# Patient Record
Sex: MALE | Race: WHITE | Employment: OTHER | URBAN - METROPOLITAN AREA
[De-identification: names, ages, dates, MRNs, and addresses within clinical notes are randomized per-mention and may not be internally consistent; named-entity substitution may affect disease eponyms.]

---

## 2022-07-24 ENCOUNTER — HOSPITAL ENCOUNTER (OUTPATIENT)
Age: 67
Setting detail: OBSERVATION
Discharge: LEFT BEFORE DISCHARGE | End: 2022-07-25
Attending: EMERGENCY MEDICINE | Admitting: INTERNAL MEDICINE
Payer: MEDICARE

## 2022-07-24 ENCOUNTER — APPOINTMENT (OUTPATIENT)
Dept: CT IMAGING | Age: 67
End: 2022-07-24
Payer: MEDICARE

## 2022-07-24 ENCOUNTER — APPOINTMENT (OUTPATIENT)
Dept: GENERAL RADIOLOGY | Age: 67
End: 2022-07-24
Payer: MEDICARE

## 2022-07-24 DIAGNOSIS — T50.901A ACCIDENTAL DRUG OVERDOSE, INITIAL ENCOUNTER: Primary | ICD-10-CM

## 2022-07-24 DIAGNOSIS — F12.20 DELTA-9-TETRAHYDROCANNABINOL (THC) DEPENDENCE (HCC): ICD-10-CM

## 2022-07-24 DIAGNOSIS — R42 DIZZINESS: ICD-10-CM

## 2022-07-24 LAB
ACETAMINOPHEN LEVEL: <5 MCG/ML (ref 10–30)
ALBUMIN SERPL-MCNC: 3.6 G/DL (ref 3.5–5.2)
ALP BLD-CCNC: 74 U/L (ref 40–129)
ALT SERPL-CCNC: 37 U/L (ref 0–40)
AMMONIA: 34.7 UMOL/L (ref 16–60)
AMPHETAMINE SCREEN, URINE: NOT DETECTED
ANION GAP SERPL CALCULATED.3IONS-SCNC: 9 MMOL/L (ref 7–16)
AST SERPL-CCNC: 34 U/L (ref 0–39)
BARBITURATE SCREEN URINE: NOT DETECTED
BASOPHILS ABSOLUTE: 0.03 E9/L (ref 0–0.2)
BASOPHILS RELATIVE PERCENT: 0.2 % (ref 0–2)
BENZODIAZEPINE SCREEN, URINE: NOT DETECTED
BILIRUB SERPL-MCNC: 0.4 MG/DL (ref 0–1.2)
BILIRUBIN URINE: NEGATIVE
BLOOD, URINE: NEGATIVE
BUN BLDV-MCNC: 16 MG/DL (ref 6–23)
CALCIUM SERPL-MCNC: 9.1 MG/DL (ref 8.6–10.2)
CANNABINOID SCREEN URINE: POSITIVE
CHLORIDE BLD-SCNC: 100 MMOL/L (ref 98–107)
CLARITY: CLEAR
CO2: 23 MMOL/L (ref 22–29)
COCAINE METABOLITE SCREEN URINE: NOT DETECTED
COLOR: YELLOW
CREAT SERPL-MCNC: 1.1 MG/DL (ref 0.7–1.2)
EOSINOPHILS ABSOLUTE: 0.01 E9/L (ref 0.05–0.5)
EOSINOPHILS RELATIVE PERCENT: 0.1 % (ref 0–6)
ETHANOL: <10 MG/DL (ref 0–0.08)
FENTANYL SCREEN, URINE: NOT DETECTED
GFR AFRICAN AMERICAN: >60
GFR NON-AFRICAN AMERICAN: >60 ML/MIN/1.73
GLUCOSE BLD-MCNC: 151 MG/DL (ref 74–99)
GLUCOSE URINE: NEGATIVE MG/DL
HCT VFR BLD CALC: 39.4 % (ref 37–54)
HEMOGLOBIN: 12.7 G/DL (ref 12.5–16.5)
IMMATURE GRANULOCYTES #: 0.04 E9/L
IMMATURE GRANULOCYTES %: 0.3 % (ref 0–5)
KETONES, URINE: NEGATIVE MG/DL
LACTIC ACID: 1.2 MMOL/L (ref 0.5–2.2)
LEUKOCYTE ESTERASE, URINE: NEGATIVE
LYMPHOCYTES ABSOLUTE: 1.01 E9/L (ref 1.5–4)
LYMPHOCYTES RELATIVE PERCENT: 8 % (ref 20–42)
Lab: ABNORMAL
MCH RBC QN AUTO: 29.1 PG (ref 26–35)
MCHC RBC AUTO-ENTMCNC: 32.2 % (ref 32–34.5)
MCV RBC AUTO: 90.2 FL (ref 80–99.9)
METER GLUCOSE: 111 MG/DL (ref 74–99)
METHADONE SCREEN, URINE: NOT DETECTED
MONOCYTES ABSOLUTE: 0.92 E9/L (ref 0.1–0.95)
MONOCYTES RELATIVE PERCENT: 7.3 % (ref 2–12)
NEUTROPHILS ABSOLUTE: 10.54 E9/L (ref 1.8–7.3)
NEUTROPHILS RELATIVE PERCENT: 84.1 % (ref 43–80)
NITRITE, URINE: NEGATIVE
OPIATE SCREEN URINE: NOT DETECTED
OXYCODONE URINE: NOT DETECTED
PDW BLD-RTO: 14.6 FL (ref 11.5–15)
PH UA: 6 (ref 5–9)
PHENCYCLIDINE SCREEN URINE: NOT DETECTED
PLATELET # BLD: 256 E9/L (ref 130–450)
PMV BLD AUTO: 9.8 FL (ref 7–12)
POTASSIUM REFLEX MAGNESIUM: 5.1 MMOL/L (ref 3.5–5)
PROTEIN UA: NEGATIVE MG/DL
RBC # BLD: 4.37 E12/L (ref 3.8–5.8)
SALICYLATE, SERUM: <0.3 MG/DL (ref 0–30)
SODIUM BLD-SCNC: 132 MMOL/L (ref 132–146)
SPECIFIC GRAVITY UA: 1.02 (ref 1–1.03)
TOTAL PROTEIN: 7.1 G/DL (ref 6.4–8.3)
TROPONIN, HIGH SENSITIVITY: 11 NG/L (ref 0–11)
TROPONIN, HIGH SENSITIVITY: 11 NG/L (ref 0–11)
UROBILINOGEN, URINE: 0.2 E.U./DL
WBC # BLD: 12.6 E9/L (ref 4.5–11.5)

## 2022-07-24 PROCEDURE — 99285 EMERGENCY DEPT VISIT HI MDM: CPT

## 2022-07-24 PROCEDURE — 85025 COMPLETE CBC W/AUTO DIFF WBC: CPT

## 2022-07-24 PROCEDURE — 81003 URINALYSIS AUTO W/O SCOPE: CPT

## 2022-07-24 PROCEDURE — 80053 COMPREHEN METABOLIC PANEL: CPT

## 2022-07-24 PROCEDURE — 80179 DRUG ASSAY SALICYLATE: CPT

## 2022-07-24 PROCEDURE — 96361 HYDRATE IV INFUSION ADD-ON: CPT

## 2022-07-24 PROCEDURE — 72040 X-RAY EXAM NECK SPINE 2-3 VW: CPT

## 2022-07-24 PROCEDURE — 70450 CT HEAD/BRAIN W/O DYE: CPT

## 2022-07-24 PROCEDURE — 36415 COLL VENOUS BLD VENIPUNCTURE: CPT

## 2022-07-24 PROCEDURE — 71045 X-RAY EXAM CHEST 1 VIEW: CPT

## 2022-07-24 PROCEDURE — 82962 GLUCOSE BLOOD TEST: CPT

## 2022-07-24 PROCEDURE — 80307 DRUG TEST PRSMV CHEM ANLYZR: CPT

## 2022-07-24 PROCEDURE — G0378 HOSPITAL OBSERVATION PER HR: HCPCS

## 2022-07-24 PROCEDURE — 93005 ELECTROCARDIOGRAM TRACING: CPT

## 2022-07-24 PROCEDURE — 83605 ASSAY OF LACTIC ACID: CPT

## 2022-07-24 PROCEDURE — 80143 DRUG ASSAY ACETAMINOPHEN: CPT

## 2022-07-24 PROCEDURE — 96360 HYDRATION IV INFUSION INIT: CPT

## 2022-07-24 PROCEDURE — 72100 X-RAY EXAM L-S SPINE 2/3 VWS: CPT

## 2022-07-24 PROCEDURE — 84484 ASSAY OF TROPONIN QUANT: CPT

## 2022-07-24 PROCEDURE — 82077 ASSAY SPEC XCP UR&BREATH IA: CPT

## 2022-07-24 PROCEDURE — 2580000003 HC RX 258

## 2022-07-24 PROCEDURE — 82140 ASSAY OF AMMONIA: CPT

## 2022-07-24 RX ORDER — 0.9 % SODIUM CHLORIDE 0.9 %
1000 INTRAVENOUS SOLUTION INTRAVENOUS ONCE
Status: COMPLETED | OUTPATIENT
Start: 2022-07-24 | End: 2022-07-24

## 2022-07-24 RX ADMIN — SODIUM CHLORIDE 1000 ML: 9 INJECTION, SOLUTION INTRAVENOUS at 17:27

## 2022-07-24 RX ADMIN — SODIUM CHLORIDE 1000 ML: 9 INJECTION, SOLUTION INTRAVENOUS at 16:05

## 2022-07-24 ASSESSMENT — PAIN SCALES - GENERAL: PAINLEVEL_OUTOF10: 9

## 2022-07-24 ASSESSMENT — ENCOUNTER SYMPTOMS
APNEA: 0
EYE DISCHARGE: 0
CHEST TIGHTNESS: 0
ABDOMINAL PAIN: 0
ABDOMINAL DISTENTION: 0
EYE ITCHING: 0
BACK PAIN: 0

## 2022-07-24 ASSESSMENT — LIFESTYLE VARIABLES
HOW OFTEN DO YOU HAVE A DRINK CONTAINING ALCOHOL: NEVER
HOW MANY STANDARD DRINKS CONTAINING ALCOHOL DO YOU HAVE ON A TYPICAL DAY: PATIENT DOES NOT DRINK

## 2022-07-24 ASSESSMENT — PAIN DESCRIPTION - LOCATION: LOCATION: BACK;KNEE

## 2022-07-24 ASSESSMENT — PAIN - FUNCTIONAL ASSESSMENT
PAIN_FUNCTIONAL_ASSESSMENT: NONE - DENIES PAIN
PAIN_FUNCTIONAL_ASSESSMENT: 0-10

## 2022-07-24 NOTE — ED PROVIDER NOTES
[employer] 74 YO male with hx of anxiety presents to the ED today c/o of overdosing on \"THC Gummies\" of Delta 8 500mg, and Delta 9 100mg. Patient's son at bedside for history. Reports they called poison control who recommended ER evaluation. Symptom onset has been acute for a time period of 1day(s). Severity is described as mild-moderate. Course of his symptoms over time is acute. Patient's son reports that his father takes [employer] 3 to help him sleep at night. He took a new type of gummy last night and did not realize how much he took. Then started feeling weird and called his son. Poison control recommended evaluation at the ER. Associated symptoms: Fatigue. He Denies: Loss of consciousness, shortness of breath, chest pain, altered mental status, numbness/tingling. Review of Systems   Constitutional:  Negative for activity change and appetite change. HENT:  Negative for congestion and dental problem. Eyes:  Negative for discharge and itching. Respiratory:  Negative for apnea and chest tightness. Cardiovascular:  Negative for chest pain and leg swelling. Gastrointestinal:  Negative for abdominal distention and abdominal pain. Endocrine: Negative for cold intolerance and heat intolerance. Genitourinary:  Negative for difficulty urinating and dysuria. Musculoskeletal:  Negative for arthralgias and back pain. Allergic/Immunologic: Negative for environmental allergies and food allergies. Neurological:  Negative for dizziness and facial asymmetry. Hematological:  Negative for adenopathy. Does not bruise/bleed easily. Psychiatric/Behavioral:  Negative for agitation and behavioral problems. Physical Exam  Constitutional:       General: He is not in acute distress. Appearance: Normal appearance. He is not toxic-appearing. HENT:      Head: Normocephalic and atraumatic. Mouth/Throat:      Mouth: Mucous membranes are moist.      Pharynx: Oropharynx is clear. Eyes:      Extraocular Movements: Extraocular movements intact. Pupils: Pupils are equal, round, and reactive to light. Cardiovascular:      Rate and Rhythm: Normal rate and regular rhythm. Pulses: Normal pulses. Heart sounds: Normal heart sounds. No murmur heard. Pulmonary:      Effort: Pulmonary effort is normal.      Breath sounds: Normal breath sounds. Abdominal:      General: There is no distension. Palpations: Abdomen is soft. Tenderness: There is no abdominal tenderness. Musculoskeletal:         General: No swelling or deformity. Normal range of motion. Cervical back: Normal range of motion and neck supple. Skin:     General: Skin is warm and dry. Neurological:      General: No focal deficit present. Mental Status: He is alert and oriented to person, place, and time. Mental status is at baseline. Cranial Nerves: No cranial nerve deficit. Motor: No weakness. Psychiatric:         Mood and Affect: Mood normal.         Behavior: Behavior normal.         Thought Content: Thought content normal.         Judgment: Judgment normal.        Procedures     MDM  Number of Diagnoses or Management Options  Accidental drug overdose, initial encounter  Delta-9-tetrahydrocannabinol (THC) dependence ([facility] [address]  Dizziness  Diagnosis management comments: Upon evaluation of the patient he is AOx3, cooperative, nontoxic appearing. He remained hemodynamically stable throughout his ER stay. Neurovascularly intact. Patient very unstable on his feet, on able to ambulate on his own. Urine drug screen positive for THC. CT scan of the head showed no acute radiographic modality. X-ray showed no acute radiographic abnormalities. Laboratory exams within acceptable limits. On multiple re-evaluations of the patient he remained unstable on his feet, unable to ambulate safely. Discussed case with Dr. Nilson Kumar, who agreed to admit patient for further evaluation and workup.        ED Course as of 07/24/22 2045   Sun Jul 24, 2022   1629 Re-evaluated the patient. Hemodynamically stable. Sleeping. Poison control recommends observation. [JR]   8260 Re-evaluated patient. Reports that he is hungry, otherwise feels fine but tired. [JR]   1955 Re-evaluated the patient. He is unable to ambulate, or stand on his own power. [JR]   2000 Patient unsteady on his feet, sitting down is wobbling left and right. He is oriented and knows where he is and why he is here and what is going on. He reportedly normally takes 25 mg of a THC for delta 8 or 9 gummy. He accidentally ate a 500 mg \"rope. \"  This was 10 PM last night. He was unstable at work, was brought in by paramedics, contacted poison control. He remains unsteady and is unable to walk on his own. He nearly fell multiple times even while standing. While there is no obvious sign of abnormality that requires immediate intervention he most likely needs ongoing evaluation including neurologic evaluation. Son is at the bedside to give him food, patient tolerated the food but the son sees that the patient is completely unsteady and unable to go home. [SO]   2001 Patient is resisting being admitted to the hospital. [SO]   2013 Spoke with Dr. Emiliano Son, agreed to admit patient  [JR]      ED Course User Index  [JR] Diana Beebe, DO  [SO] Rajan Henderson, DO       --------------------------------------------- PAST HISTORY ---------------------------------------------  Past Medical History:  has a past medical history of SCI (spinal cord injury). Past Surgical History:  has no past surgical history on file. Social History:  reports that he has been smoking cigarettes. He has been smoking an average of .5 packs per day. He has never used smokeless tobacco. He reports that he does not drink alcohol and does not use drugs. Family History: family history is not on file. The patients home medications have been reviewed.     Allergies: Erythromycin    -------------------------------------------------- RESULTS -------------------------------------------------    LABS:  Results for orders placed or performed during the hospital encounter of 07/24/22   CBC with Auto Differential   Result Value Ref Range    WBC 12.6 (H) 4.5 - 11.5 E9/L    RBC 4.37 3.80 - 5.80 E12/L    Hemoglobin 12.7 12.5 - 16.5 g/dL    Hematocrit 39.4 37.0 - 54.0 %    MCV 90.2 80.0 - 99.9 fL    MCH 29.1 26.0 - 35.0 pg    MCHC 32.2 32.0 - 34.5 %    RDW 14.6 11.5 - 15.0 fL    Platelets 282 813 - 866 E9/L    MPV 9.8 7.0 - 12.0 fL    Neutrophils % 84.1 (H) 43.0 - 80.0 %    Immature Granulocytes % 0.3 0.0 - 5.0 %    Lymphocytes % 8.0 (L) 20.0 - 42.0 %    Monocytes % 7.3 2.0 - 12.0 %    Eosinophils % 0.1 0.0 - 6.0 %    Basophils % 0.2 0.0 - 2.0 %    Neutrophils Absolute 10.54 (H) 1.80 - 7.30 E9/L    Immature Granulocytes # 0.04 E9/L    Lymphocytes Absolute 1.01 (L) 1.50 - 4.00 E9/L    Monocytes Absolute 0.92 0.10 - 0.95 E9/L    Eosinophils Absolute 0.01 (L) 0.05 - 0.50 E9/L    Basophils Absolute 0.03 0.00 - 0.20 E9/L   Comprehensive Metabolic Panel w/ Reflex to MG   Result Value Ref Range    Sodium 132 132 - 146 mmol/L    Potassium reflex Magnesium 5.1 (H) 3.5 - 5.0 mmol/L    Chloride 100 98 - 107 mmol/L    CO2 23 22 - 29 mmol/L    Anion Gap 9 7 - 16 mmol/L    Glucose 151 (H) 74 - 99 mg/dL    BUN 16 6 - 23 mg/dL    Creatinine 1.1 0.7 - 1.2 mg/dL    GFR Non-African American >60 >=60 mL/min/1.73    GFR African American >60     Calcium 9.1 8.6 - 10.2 mg/dL    Total Protein 7.1 6.4 - 8.3 g/dL    Albumin 3.6 3.5 - 5.2 g/dL    Total Bilirubin 0.4 0.0 - 1.2 mg/dL    Alkaline Phosphatase 74 40 - 129 U/L    ALT 37 0 - 40 U/L    AST 34 0 - 39 U/L   URINE DRUG SCREEN   Result Value Ref Range    Amphetamine Screen, Urine NOT DETECTED Negative <1000 ng/mL    Barbiturate Screen, Ur NOT DETECTED Negative < 200 ng/mL    Benzodiazepine Screen, Urine NOT DETECTED Negative < 200 ng/mL    Cannabinoid Scrn, Ur POSITIVE (A) Negative < 50ng/mL    Cocaine Metabolite Screen, Urine NOT DETECTED Negative < 300 ng/mL    Opiate Scrn, Ur NOT DETECTED Negative < 300ng/mL    PCP Screen, Urine NOT DETECTED Negative < 25 ng/mL    Methadone Screen, Urine NOT DETECTED Negative <300 ng/mL    Oxycodone Urine NOT DETECTED Negative <100 ng/mL    FENTANYL SCREEN, URINE NOT DETECTED Negative <1 ng/mL    Drug Screen Comment: see below    Troponin   Result Value Ref Range    Troponin, High Sensitivity 11 0 - 11 ng/L   Urinalysis   Result Value Ref Range    Color, UA Yellow Straw/Yellow    Clarity, UA Clear Clear    Glucose, Ur Negative Negative mg/dL    Bilirubin Urine Negative Negative    Ketones, Urine Negative Negative mg/dL    Specific Gravity, UA 1.020 1.005 - 1.030    Blood, Urine Negative Negative    pH, UA 6.0 5.0 - 9.0    Protein, UA Negative Negative mg/dL    Urobilinogen, Urine 0.2 <2.0 E.U./dL    Nitrite, Urine Negative Negative    Leukocyte Esterase, Urine Negative Negative   Acetaminophen Level   Result Value Ref Range    Acetaminophen Level <5.0 (L) 10.0 - 99.5 mcg/mL   Salicylate   Result Value Ref Range    Salicylate, Serum <4.6 0.0 - 30.0 mg/dL   Lactic Acid   Result Value Ref Range    Lactic Acid 1.2 0.5 - 2.2 mmol/L   Ammonia   Result Value Ref Range    Ammonia 34.7 16.0 - 60.0 umol/L   POCT Glucose   Result Value Ref Range    Meter Glucose 111 (H) 74 - 99 mg/dL       RADIOLOGY:  CT HEAD WO CONTRAST   Final Result   No acute intracranial abnormality. RECOMMENDATIONS:   Consider MRI for further clinical concern or persistence of symptoms. XR CHEST PORTABLE   Final Result   No acute process. EKG:  This EKG is signed and interpreted by me.     Rate: 75  Rhythm: Sinus  Interpretation: no acute changes  Comparison: was normal      ------------------------- NURSING NOTES AND VITALS REVIEWED ---------------------------  Date / Time Roomed:  7/24/2022  3:28 PM  ED Bed Assignment:  24/24    The nursing notes within the ED encounter and vital signs as below have been reviewed. Patient Vitals for the past 24 hrs:   BP Temp Temp src Pulse Resp SpO2 Height Weight   07/24/22 1653 112/67 -- -- 73 16 93 % -- --   07/24/22 1550 113/69 97.8 °F (36.6 °C) Oral 78 16 94 % 5' 7\" (1.702 m) 151 lb (68.5 kg)   07/24/22 1508 (!) 132/117 97.9 °F (36.6 °C) Temporal 74 16 96 % 5' 8\" (1.727 m) 150 lb (68 kg)       Oxygen Saturation Interpretation: Normal    ------------------------------------------ PROGRESS NOTES ------------------------------------------  Re-evaluation(s):  Time: 2000  Patients symptoms show no change  Repeat physical examination is not changed    Counseling:  I have spoken with the patient and discussed todays results, in addition to providing specific details for the plan of care and counseling regarding the diagnosis and prognosis. Their questions are answered at this time and they are agreeable with the plan of admission.    --------------------------------- ADDITIONAL PROVIDER NOTES ---------------------------------  Consultations:  Time: 2015. Spoke with Dr. Estephania Deras. Discussed case. They will admit the patient. This patient's ED course included: a personal history and physicial examination, re-evaluation prior to disposition, multiple bedside re-evaluations, cardiac monitoring, and continuous pulse oximetry    This patient has remained hemodynamically stable during their ED course. Diagnosis:  1. Accidental drug overdose, initial encounter    2. Dizziness New Problem   3. Delta-9-tetrahydrocannabinol (THC) dependence (HCC)        Disposition:  Patient's disposition: Admit to telemetry  Patient's condition is stable. ED Course as of 07/24/22 2045   Marlea Leyden Jul 24, 2022   1629 Re-evaluated the patient. Hemodynamically stable. Sleeping. Poison control recommends observation. [JR]   8667 Re-evaluated patient. Reports that he is hungry, otherwise feels fine but tired.   [JR]   1955 Re-evaluated the patient. He is unable to ambulate, or stand on his own power. [JR]   2000 Patient unsteady on his feet, sitting down is wobbling left and right. He is oriented and knows where he is and why he is here and what is going on. He reportedly normally takes 25 mg of a THC for delta 8 or 9 gummy. He accidentally ate a 500 mg \"rope. \"  This was 10 PM last night. He was unstable at work, was brought in by paramedics, contacted poison control. He remains unsteady and is unable to walk on his own. He nearly fell multiple times even while standing. While there is no obvious sign of abnormality that requires immediate intervention he most likely needs ongoing evaluation including neurologic evaluation.   Son is at the bedside to give him food, patient tolerated the food but the son sees that the patient is completely unsteady and unable to go home. [SO]   2001 Patient is resisting being admitted to the hospital. [SO]   2013 Spoke with Dr. Celestino Shetty, agreed to admit patient  [JR]      ED Course User Index  [JR] Thalia Hernández DO  [SO] Lenny Ewing  96., DO  Resident  07/24/22 9378

## 2022-07-24 NOTE — PROGRESS NOTES
Patient brought in to ER by son per recommendation of poison control. Patient consumed Janit Ely Rope\" with 100mg Delta 9 and 500mg of Delta 8. Entire package was consumed with package description of 8 servings. Spoke with Marston at [facility] [place] [facility],[address] [place] [address] control. Case number [id number]. Reports half life of Delta 8 is 25-30 hours. Patient continues to have fatigue and drowsiness. Per son patient is having difficulty ambulating, gait not assessed by this RN. Poison Control recommends over night observation until patient is more alert and able to ambulate. Dr. Elida Simon notified. Problem: Knowledge Deficit  Goal: Patient/family/caregiver demonstrates understanding of disease process, treatment plan, medications, and discharge instructions  Description  Complete learning assessment and assess knowledge base    Interventions:  - Provide teaching at level of understanding  - Provide teaching via preferred learning methods  Outcome: Progressing

## 2022-07-25 VITALS
RESPIRATION RATE: 18 BRPM | WEIGHT: 163.1 LBS | OXYGEN SATURATION: 99 % | BODY MASS INDEX: 25.6 KG/M2 | HEART RATE: 67 BPM | SYSTOLIC BLOOD PRESSURE: 135 MMHG | DIASTOLIC BLOOD PRESSURE: 81 MMHG | TEMPERATURE: 97.6 F | HEIGHT: 67 IN

## 2022-07-25 LAB
ALBUMIN SERPL-MCNC: 3.4 G/DL (ref 3.5–5.2)
ALP BLD-CCNC: 70 U/L (ref 40–129)
ALT SERPL-CCNC: 48 U/L (ref 0–40)
ANION GAP SERPL CALCULATED.3IONS-SCNC: 10 MMOL/L (ref 7–16)
AST SERPL-CCNC: 31 U/L (ref 0–39)
BILIRUB SERPL-MCNC: 0.5 MG/DL (ref 0–1.2)
BUN BLDV-MCNC: 15 MG/DL (ref 6–23)
CALCIUM SERPL-MCNC: 8.8 MG/DL (ref 8.6–10.2)
CHLORIDE BLD-SCNC: 104 MMOL/L (ref 98–107)
CO2: 21 MMOL/L (ref 22–29)
CREAT SERPL-MCNC: 1 MG/DL (ref 0.7–1.2)
GFR AFRICAN AMERICAN: >60
GFR NON-AFRICAN AMERICAN: >60 ML/MIN/1.73
GLUCOSE BLD-MCNC: 145 MG/DL (ref 74–99)
POTASSIUM SERPL-SCNC: 4.6 MMOL/L (ref 3.5–5)
SODIUM BLD-SCNC: 135 MMOL/L (ref 132–146)
TOTAL PROTEIN: 6.2 G/DL (ref 6.4–8.3)

## 2022-07-25 PROCEDURE — G0378 HOSPITAL OBSERVATION PER HR: HCPCS

## 2022-07-25 PROCEDURE — 36415 COLL VENOUS BLD VENIPUNCTURE: CPT

## 2022-07-25 PROCEDURE — 80053 COMPREHEN METABOLIC PANEL: CPT

## 2022-07-25 PROCEDURE — 2580000003 HC RX 258: Performed by: INTERNAL MEDICINE

## 2022-07-25 RX ORDER — ESCITALOPRAM OXALATE 20 MG/1
20 TABLET ORAL DAILY
COMMUNITY

## 2022-07-25 RX ORDER — SODIUM CHLORIDE 9 MG/ML
INJECTION, SOLUTION INTRAVENOUS CONTINUOUS
Status: DISCONTINUED | OUTPATIENT
Start: 2022-07-25 | End: 2022-07-25 | Stop reason: HOSPADM

## 2022-07-25 RX ADMIN — SODIUM CHLORIDE: 9 INJECTION, SOLUTION INTRAVENOUS at 06:50

## 2022-07-25 NOTE — CARE COORDINATION
OBS LOC. Met w/ patient and son Berto Lyons. Lives w/ Berto Lyons in a 3 story house- 2 steps to entrance. Independent PTA. No Hx DMEs, HHC, or CLOV[facility]. PCP is Dr. Luigi Bernabe and pharmacy is [address] [place] [address] [facility] [employer] [facility]. Awaiting psych eval. Per pt, plan is to return home on discharge- requesting to be discharged today- states has no needs- son will transport home.  Will follow Vic Manzanares RN case manager

## 2022-07-25 NOTE — PROGRESS NOTES
Patient wanted to leave AMA before seeing Suzanpaty Alejo and no longer wanted to wait. I advised patient of the health risks associated with leaving against AMA and he signed the [facility] paperwork. Left with his son as transport to home.

## 2022-07-25 NOTE — H&P
cyanosis, or edema  Skin:  Warm and dry, no open lesions or rash  Neuro:  Cranial nerves 2-12 intact, no focal deficits  Breast: deferred  Rectal: deferred  Genitalia:  deferred    LABS:  CBC with Differential:    Lab Results   Component Value Date/Time    WBC 12.6 07/24/2022 03:51 PM    RBC 4.37 07/24/2022 03:51 PM    HGB 12.7 07/24/2022 03:51 PM    HCT 39.4 07/24/2022 03:51 PM     07/24/2022 03:51 PM    MCV 90.2 07/24/2022 03:51 PM    MCH 29.1 07/24/2022 03:51 PM    MCHC 32.2 07/24/2022 03:51 PM    RDW 14.6 07/24/2022 03:51 PM    LYMPHOPCT 8.0 07/24/2022 03:51 PM    MONOPCT 7.3 07/24/2022 03:51 PM    BASOPCT 0.2 07/24/2022 03:51 PM    MONOSABS 0.92 07/24/2022 03:51 PM    LYMPHSABS 1.01 07/24/2022 03:51 PM    EOSABS 0.01 07/24/2022 03:51 PM    BASOSABS 0.03 07/24/2022 03:51 PM     CMP:    Lab Results   Component Value Date/Time     07/24/2022 03:51 PM    K 5.1 07/24/2022 03:51 PM     07/24/2022 03:51 PM    CO2 23 07/24/2022 03:51 PM    BUN 16 07/24/2022 03:51 PM    CREATININE 1.1 07/24/2022 03:51 PM    GFRAA >60 07/24/2022 03:51 PM    LABGLOM >60 07/24/2022 03:51 PM    GLUCOSE 151 07/24/2022 03:51 PM    PROT 7.1 07/24/2022 03:51 PM    LABALBU 3.6 07/24/2022 03:51 PM    CALCIUM 9.1 07/24/2022 03:51 PM    BILITOT 0.4 07/24/2022 03:51 PM    ALKPHOS 74 07/24/2022 03:51 PM    AST 34 07/24/2022 03:51 PM    ALT 37 07/24/2022 03:51 PM     PT/INR:  No results found for: PROTIME, INR  @cktotal:3,ckmb:3,ckmbindex:3,troponini:3@  U/A:    Lab Results   Component Value Date/Time    NITRU Negative 07/24/2022 06:18 PM    COLORU Yellow 07/24/2022 06:18 PM    PHUR 6.0 07/24/2022 06:18 PM    CLARITYU Clear 07/24/2022 06:18 PM    SPECGRAV 1.020 07/24/2022 06:18 PM    UROBILINOGEN 0.2 07/24/2022 06:18 PM    BILIRUBINUR Negative 07/24/2022 06:18 PM    BLOODU Negative 07/24/2022 06:18 PM    GLUCOSEU Negative 07/24/2022 06:18 PM    KETUA Negative 07/24/2022 06:18 PM          ASSESSMENT:    Principal Problem: Delta-9-tetrahydrocannabinol (THC) dependence ([facility] [address]  Resolved Problems:    * No resolved hospital problems.  *        PLAN:    Accidental drug overdose  Will monitor for 24 hours  Start low dose of iv  Ambulate  Home later today or tomorrow      Anxiety with depression   Ask psychiatry to see    Lily Duverney, DO  6:37 AM  7/25/2022

## 2022-07-25 NOTE — PROGRESS NOTES
Dr. Emiliano Son updated patient not wanting pysch consult. Dr Emiliano Son said pysch consult needed due to overdose. Patient updated. He is agreeable to pysch consult, but says he needs discharged today due to trouble at work and expecting a grandchild at any time.

## 2022-07-25 NOTE — PLAN OF CARE
Problem: Discharge Planning  Goal: Discharge to home or other facility with appropriate resources  7/25/2022 0931 by Jefferson Abraham RN  Outcome: Progressing  7/25/2022 0328 by Louis Pa RN  Outcome: Progressing     Problem: Pain  Goal: Verbalizes/displays adequate comfort level or baseline comfort level  Outcome: Progressing     Problem: Safety - Adult  Goal: Free from fall injury  Outcome: Progressing

## 2022-07-25 NOTE — PROGRESS NOTES
Left message with Dr. Phuong Sims office to notify patient signing out AMA.    Secure message sent to Dr. Haris Bhatt to update

## 2022-07-25 NOTE — PROGRESS NOTES
Secure message sent to Dr. Derrell Phalen to update patient is adamant about discharging, checking to see if he can be seen soon. Interviewed and evaluated

## 2022-07-26 LAB
EKG ATRIAL RATE: 75 BPM
EKG P AXIS: 66 DEGREES
EKG P-R INTERVAL: 150 MS
EKG Q-T INTERVAL: 394 MS
EKG QRS DURATION: 100 MS
EKG QTC CALCULATION (BAZETT): 439 MS
EKG R AXIS: 64 DEGREES
EKG T AXIS: 23 DEGREES
EKG VENTRICULAR RATE: 75 BPM

## 2022-08-10 ENCOUNTER — HOSPITAL ENCOUNTER (INPATIENT)
Age: 67
LOS: 1 days | Discharge: ANOTHER HOSPITAL | DRG: 281 | End: 2022-08-11
Attending: EMERGENCY MEDICINE | Admitting: INTERNAL MEDICINE
Payer: MEDICARE

## 2022-08-10 ENCOUNTER — APPOINTMENT (OUTPATIENT)
Dept: GENERAL RADIOLOGY | Age: 67
DRG: 281 | End: 2022-08-10
Payer: MEDICARE

## 2022-08-10 DIAGNOSIS — I21.4 NSTEMI (NON-ST ELEVATED MYOCARDIAL INFARCTION) (HCC): Primary | ICD-10-CM

## 2022-08-10 LAB
ALBUMIN SERPL-MCNC: 3.6 G/DL (ref 3.5–5.2)
ALP BLD-CCNC: 80 U/L (ref 40–129)
ALT SERPL-CCNC: 10 U/L (ref 0–40)
ANION GAP SERPL CALCULATED.3IONS-SCNC: 10 MMOL/L (ref 7–16)
AST SERPL-CCNC: 16 U/L (ref 0–39)
BASOPHILS ABSOLUTE: 0.05 E9/L (ref 0–0.2)
BASOPHILS RELATIVE PERCENT: 0.8 % (ref 0–2)
BILIRUB SERPL-MCNC: 0.2 MG/DL (ref 0–1.2)
BUN BLDV-MCNC: 13 MG/DL (ref 6–23)
CALCIUM SERPL-MCNC: 8.6 MG/DL (ref 8.6–10.2)
CHLORIDE BLD-SCNC: 99 MMOL/L (ref 98–107)
CO2: 21 MMOL/L (ref 22–29)
CREAT SERPL-MCNC: 1 MG/DL (ref 0.7–1.2)
EOSINOPHILS ABSOLUTE: 0.12 E9/L (ref 0.05–0.5)
EOSINOPHILS RELATIVE PERCENT: 2 % (ref 0–6)
GFR AFRICAN AMERICAN: >60
GFR NON-AFRICAN AMERICAN: >60 ML/MIN/1.73
GLUCOSE BLD-MCNC: 102 MG/DL (ref 74–99)
HCT VFR BLD CALC: 40.5 % (ref 37–54)
HEMOGLOBIN: 13.4 G/DL (ref 12.5–16.5)
IMMATURE GRANULOCYTES #: 0.01 E9/L
IMMATURE GRANULOCYTES %: 0.2 % (ref 0–5)
LYMPHOCYTES ABSOLUTE: 2.17 E9/L (ref 1.5–4)
LYMPHOCYTES RELATIVE PERCENT: 36.7 % (ref 20–42)
MCH RBC QN AUTO: 29.2 PG (ref 26–35)
MCHC RBC AUTO-ENTMCNC: 33.1 % (ref 32–34.5)
MCV RBC AUTO: 88.2 FL (ref 80–99.9)
MONOCYTES ABSOLUTE: 0.69 E9/L (ref 0.1–0.95)
MONOCYTES RELATIVE PERCENT: 11.7 % (ref 2–12)
NEUTROPHILS ABSOLUTE: 2.87 E9/L (ref 1.8–7.3)
NEUTROPHILS RELATIVE PERCENT: 48.6 % (ref 43–80)
PDW BLD-RTO: 13.8 FL (ref 11.5–15)
PLATELET # BLD: 310 E9/L (ref 130–450)
PMV BLD AUTO: 9.8 FL (ref 7–12)
POTASSIUM REFLEX MAGNESIUM: 4 MMOL/L (ref 3.5–5)
RBC # BLD: 4.59 E12/L (ref 3.8–5.8)
SODIUM BLD-SCNC: 130 MMOL/L (ref 132–146)
TOTAL PROTEIN: 6.5 G/DL (ref 6.4–8.3)
TROPONIN, HIGH SENSITIVITY: 33 NG/L (ref 0–11)
WBC # BLD: 5.9 E9/L (ref 4.5–11.5)

## 2022-08-10 PROCEDURE — 85025 COMPLETE CBC W/AUTO DIFF WBC: CPT

## 2022-08-10 PROCEDURE — 71045 X-RAY EXAM CHEST 1 VIEW: CPT

## 2022-08-10 PROCEDURE — 6370000000 HC RX 637 (ALT 250 FOR IP): Performed by: EMERGENCY MEDICINE

## 2022-08-10 PROCEDURE — 84484 ASSAY OF TROPONIN QUANT: CPT

## 2022-08-10 PROCEDURE — 93005 ELECTROCARDIOGRAM TRACING: CPT | Performed by: STUDENT IN AN ORGANIZED HEALTH CARE EDUCATION/TRAINING PROGRAM

## 2022-08-10 PROCEDURE — 80053 COMPREHEN METABOLIC PANEL: CPT

## 2022-08-10 PROCEDURE — 93005 ELECTROCARDIOGRAM TRACING: CPT | Performed by: PHYSICIAN ASSISTANT

## 2022-08-10 PROCEDURE — 99285 EMERGENCY DEPT VISIT HI MDM: CPT

## 2022-08-10 RX ORDER — NITROGLYCERIN 0.4 MG/1
0.4 TABLET SUBLINGUAL ONCE
Status: COMPLETED | OUTPATIENT
Start: 2022-08-10 | End: 2022-08-10

## 2022-08-10 RX ADMIN — NITROGLYCERIN 0.4 MG: 0.4 TABLET SUBLINGUAL at 23:48

## 2022-08-11 ENCOUNTER — HOSPITAL ENCOUNTER (INPATIENT)
Age: 67
LOS: 1 days | Discharge: HOME | DRG: 247 | End: 2022-08-12
Attending: INTERNAL MEDICINE | Admitting: INTERNAL MEDICINE
Payer: MEDICARE

## 2022-08-11 VITALS
HEART RATE: 58 BPM | WEIGHT: 118.6 LBS | BODY MASS INDEX: 18.62 KG/M2 | DIASTOLIC BLOOD PRESSURE: 87 MMHG | HEIGHT: 67 IN | RESPIRATION RATE: 14 BRPM | TEMPERATURE: 97.8 F | OXYGEN SATURATION: 96 % | SYSTOLIC BLOOD PRESSURE: 145 MMHG

## 2022-08-11 PROBLEM — I21.4 NSTEMI (NON-ST ELEVATED MYOCARDIAL INFARCTION) (HCC): Status: ACTIVE | Noted: 2022-08-11

## 2022-08-11 PROBLEM — I25.10 CAD IN NATIVE ARTERY: Status: ACTIVE | Noted: 2022-08-11

## 2022-08-11 PROBLEM — Z72.0 TOBACCO ABUSE: Status: ACTIVE | Noted: 2022-08-11

## 2022-08-11 PROBLEM — I20.0 UNSTABLE ANGINA PECTORIS (HCC): Status: ACTIVE | Noted: 2022-08-11

## 2022-08-11 LAB
ABO/RH: NORMAL
ANTIBODY SCREEN: NORMAL
APTT: 133.5 SEC (ref 24.5–35.1)
APTT: 29.8 SEC (ref 24.5–35.1)
APTT: 29.9 SEC (ref 24.5–35.1)
CHOLESTEROL, TOTAL: 164 MG/DL (ref 0–199)
EKG ATRIAL RATE: 54 BPM
EKG ATRIAL RATE: 57 BPM
EKG ATRIAL RATE: 60 BPM
EKG ATRIAL RATE: 79 BPM
EKG ATRIAL RATE: 80 BPM
EKG P AXIS: 38 DEGREES
EKG P AXIS: 50 DEGREES
EKG P AXIS: 53 DEGREES
EKG P AXIS: 64 DEGREES
EKG P AXIS: 66 DEGREES
EKG P-R INTERVAL: 136 MS
EKG P-R INTERVAL: 140 MS
EKG P-R INTERVAL: 150 MS
EKG P-R INTERVAL: 152 MS
EKG P-R INTERVAL: 154 MS
EKG Q-T INTERVAL: 408 MS
EKG Q-T INTERVAL: 418 MS
EKG Q-T INTERVAL: 434 MS
EKG Q-T INTERVAL: 474 MS
EKG Q-T INTERVAL: 480 MS
EKG QRS DURATION: 88 MS
EKG QRS DURATION: 90 MS
EKG QRS DURATION: 90 MS
EKG QRS DURATION: 92 MS
EKG QRS DURATION: 92 MS
EKG QTC CALCULATION (BAZETT): 434 MS
EKG QTC CALCULATION (BAZETT): 455 MS
EKG QTC CALCULATION (BAZETT): 461 MS
EKG QTC CALCULATION (BAZETT): 467 MS
EKG QTC CALCULATION (BAZETT): 482 MS
EKG R AXIS: 47 DEGREES
EKG R AXIS: 49 DEGREES
EKG R AXIS: 50 DEGREES
EKG R AXIS: 54 DEGREES
EKG R AXIS: 55 DEGREES
EKG T AXIS: 71 DEGREES
EKG T AXIS: 72 DEGREES
EKG T AXIS: 76 DEGREES
EKG T AXIS: 79 DEGREES
EKG T AXIS: 94 DEGREES
EKG VENTRICULAR RATE: 54 BPM
EKG VENTRICULAR RATE: 57 BPM
EKG VENTRICULAR RATE: 60 BPM
EKG VENTRICULAR RATE: 79 BPM
EKG VENTRICULAR RATE: 80 BPM
HBA1C MFR BLD: 6.1 % (ref 4–5.6)
HCT VFR BLD CALC: 38.7 % (ref 37–54)
HDLC SERPL-MCNC: 43 MG/DL
HEMOGLOBIN: 12.6 G/DL (ref 12.5–16.5)
LDL CHOLESTEROL CALCULATED: 105 MG/DL (ref 0–99)
MCH RBC QN AUTO: 29.3 PG (ref 26–35)
MCHC RBC AUTO-ENTMCNC: 32.6 % (ref 32–34.5)
MCV RBC AUTO: 90 FL (ref 80–99.9)
PDW BLD-RTO: 14 FL (ref 11.5–15)
PLATELET # BLD: 286 E9/L (ref 130–450)
PMV BLD AUTO: 10 FL (ref 7–12)
POC ACT LR: 140 SECONDS
POC ACT LR: 287 SECONDS
RBC # BLD: 4.3 E12/L (ref 3.8–5.8)
TRIGL SERPL-MCNC: 81 MG/DL (ref 0–149)
TROPONIN, HIGH SENSITIVITY: 45 NG/L (ref 0–11)
TROPONIN, HIGH SENSITIVITY: 47 NG/L (ref 0–11)
TROPONIN, HIGH SENSITIVITY: 53 NG/L (ref 0–11)
TSH SERPL DL<=0.05 MIU/L-ACNC: 2.04 UIU/ML (ref 0.27–4.2)
VLDLC SERPL CALC-MCNC: 16 MG/DL
WBC # BLD: 8.6 E9/L (ref 4.5–11.5)

## 2022-08-11 PROCEDURE — 6370000000 HC RX 637 (ALT 250 FOR IP): Performed by: STUDENT IN AN ORGANIZED HEALTH CARE EDUCATION/TRAINING PROGRAM

## 2022-08-11 PROCEDURE — 85347 COAGULATION TIME ACTIVATED: CPT

## 2022-08-11 PROCEDURE — 93005 ELECTROCARDIOGRAM TRACING: CPT | Performed by: STUDENT IN AN ORGANIZED HEALTH CARE EDUCATION/TRAINING PROGRAM

## 2022-08-11 PROCEDURE — 96366 THER/PROPH/DIAG IV INF ADDON: CPT

## 2022-08-11 PROCEDURE — 2140000000 HC CCU INTERMEDIATE R&B

## 2022-08-11 PROCEDURE — 36415 COLL VENOUS BLD VENIPUNCTURE: CPT

## 2022-08-11 PROCEDURE — 84484 ASSAY OF TROPONIN QUANT: CPT

## 2022-08-11 PROCEDURE — 93005 ELECTROCARDIOGRAM TRACING: CPT | Performed by: INTERNAL MEDICINE

## 2022-08-11 PROCEDURE — APPSS60 APP SPLIT SHARED TIME 46-60 MINUTES: Performed by: NURSE PRACTITIONER

## 2022-08-11 PROCEDURE — 6370000000 HC RX 637 (ALT 250 FOR IP)

## 2022-08-11 PROCEDURE — 4A023N7 MEASUREMENT OF CARDIAC SAMPLING AND PRESSURE, LEFT HEART, PERCUTANEOUS APPROACH: ICD-10-PCS | Performed by: INTERNAL MEDICINE

## 2022-08-11 PROCEDURE — 6370000000 HC RX 637 (ALT 250 FOR IP): Performed by: INTERNAL MEDICINE

## 2022-08-11 PROCEDURE — 92928 PRQ TCAT PLMT NTRAC ST 1 LES: CPT | Performed by: INTERNAL MEDICINE

## 2022-08-11 PROCEDURE — 93458 L HRT ARTERY/VENTRICLE ANGIO: CPT | Performed by: INTERNAL MEDICINE

## 2022-08-11 PROCEDURE — 027034Z DILATION OF CORONARY ARTERY, ONE ARTERY WITH DRUG-ELUTING INTRALUMINAL DEVICE, PERCUTANEOUS APPROACH: ICD-10-PCS | Performed by: INTERNAL MEDICINE

## 2022-08-11 PROCEDURE — C1725 CATH, TRANSLUMIN NON-LASER: HCPCS

## 2022-08-11 PROCEDURE — 93571 IV DOP VEL&/PRESS C FLO 1ST: CPT | Performed by: INTERNAL MEDICINE

## 2022-08-11 PROCEDURE — C1894 INTRO/SHEATH, NON-LASER: HCPCS

## 2022-08-11 PROCEDURE — 6360000002 HC RX W HCPCS

## 2022-08-11 PROCEDURE — C1887 CATHETER, GUIDING: HCPCS

## 2022-08-11 PROCEDURE — 86850 RBC ANTIBODY SCREEN: CPT

## 2022-08-11 PROCEDURE — 2709999900 HC NON-CHARGEABLE SUPPLY

## 2022-08-11 PROCEDURE — 86900 BLOOD TYPING SEROLOGIC ABO: CPT

## 2022-08-11 PROCEDURE — 1200000000 HC SEMI PRIVATE

## 2022-08-11 PROCEDURE — 2500000003 HC RX 250 WO HCPCS

## 2022-08-11 PROCEDURE — C9600 PERC DRUG-EL COR STENT SING: HCPCS

## 2022-08-11 PROCEDURE — 93458 L HRT ARTERY/VENTRICLE ANGIO: CPT

## 2022-08-11 PROCEDURE — 84443 ASSAY THYROID STIM HORMONE: CPT

## 2022-08-11 PROCEDURE — 85730 THROMBOPLASTIN TIME PARTIAL: CPT

## 2022-08-11 PROCEDURE — 96365 THER/PROPH/DIAG IV INF INIT: CPT

## 2022-08-11 PROCEDURE — G0378 HOSPITAL OBSERVATION PER HR: HCPCS

## 2022-08-11 PROCEDURE — B2111ZZ FLUOROSCOPY OF MULTIPLE CORONARY ARTERIES USING LOW OSMOLAR CONTRAST: ICD-10-PCS | Performed by: INTERNAL MEDICINE

## 2022-08-11 PROCEDURE — 6360000002 HC RX W HCPCS: Performed by: INTERNAL MEDICINE

## 2022-08-11 PROCEDURE — 80061 LIPID PANEL: CPT

## 2022-08-11 PROCEDURE — 83036 HEMOGLOBIN GLYCOSYLATED A1C: CPT

## 2022-08-11 PROCEDURE — 85027 COMPLETE CBC AUTOMATED: CPT

## 2022-08-11 PROCEDURE — 96375 TX/PRO/DX INJ NEW DRUG ADDON: CPT

## 2022-08-11 PROCEDURE — 2580000003 HC RX 258: Performed by: INTERNAL MEDICINE

## 2022-08-11 PROCEDURE — C1769 GUIDE WIRE: HCPCS

## 2022-08-11 PROCEDURE — 4A033BC MEASUREMENT OF ARTERIAL PRESSURE, CORONARY, PERCUTANEOUS APPROACH: ICD-10-PCS | Performed by: INTERNAL MEDICINE

## 2022-08-11 PROCEDURE — 86901 BLOOD TYPING SEROLOGIC RH(D): CPT

## 2022-08-11 PROCEDURE — 96376 TX/PRO/DX INJ SAME DRUG ADON: CPT

## 2022-08-11 PROCEDURE — 6360000002 HC RX W HCPCS: Performed by: STUDENT IN AN ORGANIZED HEALTH CARE EDUCATION/TRAINING PROGRAM

## 2022-08-11 PROCEDURE — 99223 1ST HOSP IP/OBS HIGH 75: CPT | Performed by: INTERNAL MEDICINE

## 2022-08-11 PROCEDURE — C1874 STENT, COATED/COV W/DEL SYS: HCPCS

## 2022-08-11 RX ORDER — ATORVASTATIN CALCIUM 40 MG/1
80 TABLET, FILM COATED ORAL DAILY
Status: DISCONTINUED | OUTPATIENT
Start: 2022-08-11 | End: 2022-08-11 | Stop reason: HOSPADM

## 2022-08-11 RX ORDER — SODIUM CHLORIDE 0.9 % (FLUSH) 0.9 %
5-40 SYRINGE (ML) INJECTION PRN
Status: DISCONTINUED | OUTPATIENT
Start: 2022-08-11 | End: 2022-08-12 | Stop reason: HOSPADM

## 2022-08-11 RX ORDER — SODIUM CHLORIDE 0.9 % (FLUSH) 0.9 %
5-40 SYRINGE (ML) INJECTION EVERY 12 HOURS SCHEDULED
Status: DISCONTINUED | OUTPATIENT
Start: 2022-08-11 | End: 2022-08-12 | Stop reason: HOSPADM

## 2022-08-11 RX ORDER — ESCITALOPRAM OXALATE 10 MG/1
20 TABLET ORAL DAILY
Status: CANCELLED | OUTPATIENT
Start: 2022-08-12

## 2022-08-11 RX ORDER — ACETAMINOPHEN 325 MG/1
650 TABLET ORAL EVERY 6 HOURS PRN
Status: DISCONTINUED | OUTPATIENT
Start: 2022-08-11 | End: 2022-08-12 | Stop reason: HOSPADM

## 2022-08-11 RX ORDER — SODIUM CHLORIDE 9 MG/ML
INJECTION, SOLUTION INTRAVENOUS CONTINUOUS
Status: ACTIVE | OUTPATIENT
Start: 2022-08-11 | End: 2022-08-11

## 2022-08-11 RX ORDER — HEPARIN SODIUM 1000 [USP'U]/ML
30 INJECTION, SOLUTION INTRAVENOUS; SUBCUTANEOUS PRN
Status: DISCONTINUED | OUTPATIENT
Start: 2022-08-11 | End: 2022-08-11 | Stop reason: HOSPADM

## 2022-08-11 RX ORDER — ASPIRIN 81 MG/1
81 TABLET, CHEWABLE ORAL DAILY
Status: CANCELLED | OUTPATIENT
Start: 2022-08-12

## 2022-08-11 RX ORDER — HEPARIN SODIUM 1000 [USP'U]/ML
4000 INJECTION, SOLUTION INTRAVENOUS; SUBCUTANEOUS ONCE
Status: COMPLETED | OUTPATIENT
Start: 2022-08-11 | End: 2022-08-11

## 2022-08-11 RX ORDER — ATORVASTATIN CALCIUM 40 MG/1
40 TABLET, FILM COATED ORAL NIGHTLY
Status: DISCONTINUED | OUTPATIENT
Start: 2022-08-12 | End: 2022-08-11

## 2022-08-11 RX ORDER — HEPARIN SODIUM 10000 [USP'U]/100ML
5-30 INJECTION, SOLUTION INTRAVENOUS CONTINUOUS
Status: DISCONTINUED | OUTPATIENT
Start: 2022-08-11 | End: 2022-08-11

## 2022-08-11 RX ORDER — HEPARIN SODIUM 1000 [USP'U]/ML
60 INJECTION, SOLUTION INTRAVENOUS; SUBCUTANEOUS PRN
Status: DISCONTINUED | OUTPATIENT
Start: 2022-08-11 | End: 2022-08-11 | Stop reason: HOSPADM

## 2022-08-11 RX ORDER — ATORVASTATIN CALCIUM 80 MG/1
80 TABLET, FILM COATED ORAL DAILY
Status: DISCONTINUED | OUTPATIENT
Start: 2022-08-12 | End: 2022-08-12 | Stop reason: HOSPADM

## 2022-08-11 RX ORDER — HEPARIN SODIUM 1000 [USP'U]/ML
30 INJECTION, SOLUTION INTRAVENOUS; SUBCUTANEOUS PRN
Status: CANCELLED | OUTPATIENT
Start: 2022-08-11

## 2022-08-11 RX ORDER — ACETAMINOPHEN 325 MG/1
650 TABLET ORAL EVERY 6 HOURS PRN
Status: DISCONTINUED | OUTPATIENT
Start: 2022-08-11 | End: 2022-08-11 | Stop reason: HOSPADM

## 2022-08-11 RX ORDER — HEPARIN SODIUM 1000 [USP'U]/ML
30 INJECTION, SOLUTION INTRAVENOUS; SUBCUTANEOUS PRN
Status: DISCONTINUED | OUTPATIENT
Start: 2022-08-11 | End: 2022-08-11

## 2022-08-11 RX ORDER — ASPIRIN 81 MG/1
81 TABLET ORAL DAILY
Status: DISCONTINUED | OUTPATIENT
Start: 2022-08-12 | End: 2022-08-11 | Stop reason: SDUPTHER

## 2022-08-11 RX ORDER — ATORVASTATIN CALCIUM 40 MG/1
80 TABLET, FILM COATED ORAL DAILY
Status: CANCELLED | OUTPATIENT
Start: 2022-08-12

## 2022-08-11 RX ORDER — ESCITALOPRAM OXALATE 10 MG/1
20 TABLET ORAL DAILY
Status: DISCONTINUED | OUTPATIENT
Start: 2022-08-12 | End: 2022-08-12 | Stop reason: HOSPADM

## 2022-08-11 RX ORDER — HEPARIN SODIUM 1000 [USP'U]/ML
60 INJECTION, SOLUTION INTRAVENOUS; SUBCUTANEOUS PRN
Status: DISCONTINUED | OUTPATIENT
Start: 2022-08-11 | End: 2022-08-11

## 2022-08-11 RX ORDER — HEPARIN SODIUM 10000 [USP'U]/100ML
5-30 INJECTION, SOLUTION INTRAVENOUS CONTINUOUS
Status: CANCELLED | OUTPATIENT
Start: 2022-08-11

## 2022-08-11 RX ORDER — ESCITALOPRAM OXALATE 10 MG/1
20 TABLET ORAL DAILY
Status: DISCONTINUED | OUTPATIENT
Start: 2022-08-12 | End: 2022-08-11 | Stop reason: SDUPTHER

## 2022-08-11 RX ORDER — ASPIRIN 81 MG/1
324 TABLET, CHEWABLE ORAL ONCE
Status: COMPLETED | OUTPATIENT
Start: 2022-08-11 | End: 2022-08-11

## 2022-08-11 RX ORDER — HEPARIN SODIUM 1000 [USP'U]/ML
2000 INJECTION, SOLUTION INTRAVENOUS; SUBCUTANEOUS PRN
Status: DISCONTINUED | OUTPATIENT
Start: 2022-08-11 | End: 2022-08-11

## 2022-08-11 RX ORDER — HEPARIN SODIUM 1000 [USP'U]/ML
4000 INJECTION, SOLUTION INTRAVENOUS; SUBCUTANEOUS PRN
Status: DISCONTINUED | OUTPATIENT
Start: 2022-08-11 | End: 2022-08-11

## 2022-08-11 RX ORDER — HEPARIN SODIUM 10000 [USP'U]/100ML
5-30 INJECTION, SOLUTION INTRAVENOUS CONTINUOUS
Status: DISCONTINUED | OUTPATIENT
Start: 2022-08-11 | End: 2022-08-11 | Stop reason: HOSPADM

## 2022-08-11 RX ORDER — MORPHINE SULFATE 2 MG/ML
1 INJECTION, SOLUTION INTRAMUSCULAR; INTRAVENOUS
Status: DISCONTINUED | OUTPATIENT
Start: 2022-08-11 | End: 2022-08-12 | Stop reason: HOSPADM

## 2022-08-11 RX ORDER — HEPARIN SODIUM 1000 [USP'U]/ML
60 INJECTION, SOLUTION INTRAVENOUS; SUBCUTANEOUS PRN
Status: CANCELLED | OUTPATIENT
Start: 2022-08-11

## 2022-08-11 RX ORDER — SODIUM CHLORIDE 9 MG/ML
INJECTION, SOLUTION INTRAVENOUS PRN
Status: DISCONTINUED | OUTPATIENT
Start: 2022-08-11 | End: 2022-08-12 | Stop reason: HOSPADM

## 2022-08-11 RX ORDER — ESCITALOPRAM OXALATE 10 MG/1
20 TABLET ORAL DAILY
Status: DISCONTINUED | OUTPATIENT
Start: 2022-08-11 | End: 2022-08-11 | Stop reason: HOSPADM

## 2022-08-11 RX ORDER — MORPHINE SULFATE 2 MG/ML
1 INJECTION, SOLUTION INTRAMUSCULAR; INTRAVENOUS
Status: CANCELLED | OUTPATIENT
Start: 2022-08-11

## 2022-08-11 RX ORDER — ASPIRIN 81 MG/1
81 TABLET, CHEWABLE ORAL DAILY
Status: DISCONTINUED | OUTPATIENT
Start: 2022-08-11 | End: 2022-08-11 | Stop reason: HOSPADM

## 2022-08-11 RX ORDER — ASPIRIN 81 MG/1
81 TABLET, CHEWABLE ORAL DAILY
Status: DISCONTINUED | OUTPATIENT
Start: 2022-08-12 | End: 2022-08-12 | Stop reason: HOSPADM

## 2022-08-11 RX ORDER — ACETAMINOPHEN 325 MG/1
650 TABLET ORAL EVERY 6 HOURS PRN
Status: CANCELLED | OUTPATIENT
Start: 2022-08-11

## 2022-08-11 RX ORDER — ACETAMINOPHEN 325 MG/1
650 TABLET ORAL EVERY 4 HOURS PRN
Status: DISCONTINUED | OUTPATIENT
Start: 2022-08-11 | End: 2022-08-11 | Stop reason: SDUPTHER

## 2022-08-11 RX ORDER — MORPHINE SULFATE 2 MG/ML
1 INJECTION, SOLUTION INTRAMUSCULAR; INTRAVENOUS
Status: DISCONTINUED | OUTPATIENT
Start: 2022-08-11 | End: 2022-08-11 | Stop reason: HOSPADM

## 2022-08-11 RX ADMIN — MORPHINE SULFATE 1 MG: 2 INJECTION, SOLUTION INTRAMUSCULAR; INTRAVENOUS at 04:06

## 2022-08-11 RX ADMIN — NITROGLYCERIN 0.5 INCH: 20 OINTMENT TOPICAL at 06:15

## 2022-08-11 RX ADMIN — SODIUM CHLORIDE: 9 INJECTION, SOLUTION INTRAVENOUS at 12:49

## 2022-08-11 RX ADMIN — ESCITALOPRAM OXALATE 20 MG: 10 TABLET ORAL at 08:48

## 2022-08-11 RX ADMIN — ATORVASTATIN CALCIUM 80 MG: 40 TABLET, FILM COATED ORAL at 08:48

## 2022-08-11 RX ADMIN — SODIUM CHLORIDE, PRESERVATIVE FREE 10 ML: 5 INJECTION INTRAVENOUS at 20:19

## 2022-08-11 RX ADMIN — HEPARIN SODIUM 4000 UNITS: 1000 INJECTION INTRAVENOUS; SUBCUTANEOUS at 01:13

## 2022-08-11 RX ADMIN — TICAGRELOR 90 MG: 90 TABLET ORAL at 20:19

## 2022-08-11 RX ADMIN — HEPARIN SODIUM 12 UNITS/KG/HR: 10000 INJECTION, SOLUTION INTRAVENOUS at 01:17

## 2022-08-11 RX ADMIN — HEPARIN SODIUM 9 UNITS/KG/HR: 10000 INJECTION, SOLUTION INTRAVENOUS at 07:22

## 2022-08-11 RX ADMIN — SODIUM CHLORIDE: 9 INJECTION, SOLUTION INTRAVENOUS at 18:09

## 2022-08-11 RX ADMIN — ASPIRIN 324 MG: 81 TABLET, CHEWABLE ORAL at 01:12

## 2022-08-11 RX ADMIN — ASPIRIN 81 MG CHEWABLE TABLET 81 MG: 81 TABLET CHEWABLE at 08:48

## 2022-08-11 ASSESSMENT — PAIN SCALES - GENERAL
PAINLEVEL_OUTOF10: 3
PAINLEVEL_OUTOF10: 0
PAINLEVEL_OUTOF10: 6
PAINLEVEL_OUTOF10: 6
PAINLEVEL_OUTOF10: 5

## 2022-08-11 ASSESSMENT — ENCOUNTER SYMPTOMS
SORE THROAT: 0
SINUS PRESSURE: 0
COUGH: 0
CHEST TIGHTNESS: 1
CONSTIPATION: 0
SINUS PAIN: 0
DIARRHEA: 0
NAUSEA: 0
VOMITING: 0
EYE PAIN: 0
EYE REDNESS: 0
ABDOMINAL PAIN: 0
SHORTNESS OF BREATH: 1
BACK PAIN: 0

## 2022-08-11 ASSESSMENT — PAIN DESCRIPTION - LOCATION: LOCATION: ARM

## 2022-08-11 ASSESSMENT — PAIN DESCRIPTION - DESCRIPTORS: DESCRIPTORS: SHARP

## 2022-08-11 ASSESSMENT — PAIN DESCRIPTION - FREQUENCY: FREQUENCY: INTERMITTENT

## 2022-08-11 ASSESSMENT — PAIN DESCRIPTION - DIRECTION: RADIATING_TOWARDS: CHEST

## 2022-08-11 ASSESSMENT — PAIN DESCRIPTION - ORIENTATION: ORIENTATION: LEFT

## 2022-08-11 ASSESSMENT — PAIN DESCRIPTION - ONSET: ONSET: ON-GOING

## 2022-08-11 NOTE — H&P
CHIEF COMPLAINT:  chest pain      HISTORY OF PRESENT ILLNESS:      The patient is a 75 y.o. male patient of whom came home from work last night and developed chest discomfort. He had it in left arm into neck and radiated to chest. Came to ER got relief with nitroglycerin. Recurrent this am. No cough. He had this pain several days ago very similar. It went away on own. He smokes and has family hx of CAD    Past Medical History:    Past Medical History:   Diagnosis Date    SCI (spinal cord injury)        Past Surgical History:    No past surgical history on file. Medications Prior to Admission:    Medications Prior to Admission: escitalopram (LEXAPRO) 20 MG tablet, Take 20 mg by mouth in the morning. [DISCONTINUED] ibuprofen (ADVIL;MOTRIN) 800 MG tablet, Take 1 tablet by mouth every 6 hours as needed for Pain    Allergies:    Erythromycin    Social History:    reports that he has been smoking cigarettes. He has been smoking an average of .5 packs per day. He has never used smokeless tobacco. He reports that he does not drink alcohol and does not use drugs. Family History:   family history is not on file. REVIEW OF SYSTEMS:  As above in the HPI, otherwise negative    PHYSICAL EXAM:    Vitals:  /75   Pulse 60   Temp 97.7 °F (36.5 °C) (Oral)   Resp 16   Ht 5' 7\" (1.702 m)   Wt 118 lb 9.6 oz (53.8 kg)   SpO2 97%   BMI 18.58 kg/m²     General:  Awake, alert, oriented X 3. Well developed, well nourished, well groomed. No apparent distress. HEENT:  Normocephalic, atraumatic. Pupils equal, round, reactive to light. No scleral icterus. No conjunctival injection. Normal lips, teeth, and gums. No nasal discharge. Neck:  Supple  Heart:  RRR, no murmurs, gallops, rubs  Lungs:  CTA bilaterally, bilat symmetrical expansion, no wheeze, rales, or rhonchi  Abdomen:   Bowel sounds present, soft, nontender, no masses, no organomegaly, no peritoneal signs  Extremities:  No clubbing, cyanosis, or edema  Skin:  Warm and dry, no open lesions or rash  Neuro:  Cranial nerves 2-12 intact, no focal deficits  Breast: deferred  Rectal: deferred  Genitalia:  deferred    LABS:  CBC with Differential:    Lab Results   Component Value Date/Time    WBC 8.6 08/11/2022 12:42 AM    RBC 4.30 08/11/2022 12:42 AM    HGB 12.6 08/11/2022 12:42 AM    HCT 38.7 08/11/2022 12:42 AM     08/11/2022 12:42 AM    MCV 90.0 08/11/2022 12:42 AM    MCH 29.3 08/11/2022 12:42 AM    MCHC 32.6 08/11/2022 12:42 AM    RDW 14.0 08/11/2022 12:42 AM    LYMPHOPCT 36.7 08/10/2022 09:58 PM    MONOPCT 11.7 08/10/2022 09:58 PM    BASOPCT 0.8 08/10/2022 09:58 PM    MONOSABS 0.69 08/10/2022 09:58 PM    LYMPHSABS 2.17 08/10/2022 09:58 PM    EOSABS 0.12 08/10/2022 09:58 PM    BASOSABS 0.05 08/10/2022 09:58 PM     CMP:    Lab Results   Component Value Date/Time     08/10/2022 09:58 PM    K 4.0 08/10/2022 09:58 PM    CL 99 08/10/2022 09:58 PM    CO2 21 08/10/2022 09:58 PM    BUN 13 08/10/2022 09:58 PM    CREATININE 1.0 08/10/2022 09:58 PM    GFRAA >60 08/10/2022 09:58 PM    LABGLOM >60 08/10/2022 09:58 PM    GLUCOSE 102 08/10/2022 09:58 PM    PROT 6.5 08/10/2022 09:58 PM    LABALBU 3.6 08/10/2022 09:58 PM    CALCIUM 8.6 08/10/2022 09:58 PM    BILITOT 0.2 08/10/2022 09:58 PM    ALKPHOS 80 08/10/2022 09:58 PM    AST 16 08/10/2022 09:58 PM    ALT 10 08/10/2022 09:58 PM     PT/INR:  No results found for: PROTIME, INR  @cktotal:3,ckmb:3,ckmbindex:3,troponini:3@  U/A:    Lab Results   Component Value Date/Time    NITRU Negative 07/24/2022 06:18 PM    COLORU Yellow 07/24/2022 06:18 PM    PHUR 6.0 07/24/2022 06:18 PM    CLARITYU Clear 07/24/2022 06:18 PM    SPECGRAV 1.020 07/24/2022 06:18 PM    UROBILINOGEN 0.2 07/24/2022 06:18 PM    BILIRUBINUR Negative 07/24/2022 06:18 PM    BLOODU Negative 07/24/2022 06:18 PM    GLUCOSEU Negative 07/24/2022 06:18 PM    KETUA Negative 07/24/2022 06:18 PM          ASSESSMENT:      Chest pain      PLAN:    Acute coronary syndrome with chest pain-  Added nitropaste this am  Asa daily  On heparin drip  Very well may need cath today with recurrent pain  Cardiology notified overnight  Repeated his EKG  Currently stable    Tobacco abuse  add aerosol has some wheeze    Anxiety-  Continue Paige Gilliland DO  6:23 AM  8/11/2022

## 2022-08-11 NOTE — ED NOTES
NIRAV faxed to inpt unit, phone conformation     Mariya Cooley RN  08/11/22 MAX Atkins  08/11/22 2809

## 2022-08-11 NOTE — ED PROVIDER NOTES
[facility]  Department of Emergency Medicine     Written by: Helen Thomas DO  Patient Name: Basilio Napier  Attending Provider: Teresa Rico MD  Admit Date: 8/10/2022 11:15 PM  MRN: [id number]                   : 1955        Chief Complaint   Patient presents with    Arm Pain     Pt presents with pain in his left arm that radiates to his chest.  Pt states this began yesterday. Pt states some minor sob. - Chief complaint    Mr. Solomon Medel is a 75year old male who presents to the ED due to arm and chest pain. Patient states that he has been having left arm pain radiating up his shoulder and into his midsternal chest.  He also endorses a pressure-like sensation in the middle of his chest with mild chest tightness as well. Patient states the symptoms were intermittent over the past 2 days but have been constant since 5 PM tonight. Denies any previous cardiac history but states he smokes 2 packs a day and does have a family history of cardiac disease. Endorses chronic shortness of breath that has not markedly worse than his baseline. States the pain is moderate worsened with exertion and denies any aggravating relieving factors. Denies any recent fever, chills, nausea, vomiting, abdominal pain, bowel or urinary changes, headaches or vision changes. Review of Systems   Constitutional:  Negative for chills, fatigue and fever. HENT:  Negative for congestion, sinus pressure, sinus pain and sore throat. Eyes:  Negative for pain, redness and visual disturbance. Respiratory:  Positive for chest tightness and shortness of breath. Negative for cough. Cardiovascular:  Positive for chest pain. Negative for palpitations and leg swelling. Gastrointestinal:  Negative for abdominal pain, constipation, diarrhea, nausea and vomiting. Genitourinary:  Negative for dysuria, flank pain, frequency, hematuria and urgency. Musculoskeletal:  Positive for myalgias (Left arm).  Negative for arthralgias, back pain, gait problem and joint swelling. Skin:  Negative for rash. Neurological:  Negative for dizziness, tremors, syncope, weakness, light-headedness, numbness and headaches. Physical Exam  Constitutional:       Appearance: Normal appearance. He is normal weight. HENT:      Head: Normocephalic and atraumatic. Right Ear: External ear normal.      Left Ear: External ear normal.      Nose: Nose normal.      Mouth/Throat:      Mouth: Mucous membranes are moist.      Pharynx: Oropharynx is clear. Eyes:      Extraocular Movements: Extraocular movements intact. Conjunctiva/sclera: Conjunctivae normal.   Cardiovascular:      Rate and Rhythm: Normal rate and regular rhythm. Pulses: Normal pulses. Heart sounds: Normal heart sounds. Pulmonary:      Effort: Pulmonary effort is normal. No respiratory distress. Breath sounds: Normal breath sounds. No wheezing. Abdominal:      General: Abdomen is flat. Bowel sounds are normal. There is no distension. Palpations: Abdomen is soft. Tenderness: There is no abdominal tenderness. There is no guarding or rebound. Musculoskeletal:         General: No swelling or tenderness. Normal range of motion. Cervical back: Normal range of motion and neck supple. No rigidity or tenderness. Skin:     General: Skin is warm and dry. Findings: No rash. Neurological:      General: No focal deficit present. Mental Status: He is alert and oriented to person, place, and time. Mental status is at baseline. Cranial Nerves: No cranial nerve deficit. Sensory: No sensory deficit. Motor: No weakness. Psychiatric:         Mood and Affect: Mood normal.         Behavior: Behavior normal.        Procedures       MDM  Number of Diagnoses or Management Options  NSTEMI (non-ST elevated myocardial infarction) [facility])  Diagnosis management comments:  This is a 75year old male who presents to the ED due to arm and chest pain. Patient CBC and CMP remarkable benign with normal limits. Troponin was 33 with repeat of 47 with a positive delta of 14. Cardiology was consulted and Dr. Alana Rapp stated the patient should be placed on heparin and admitted to the hospital.  Patient's coags were normal.  Chest x-ray revealed no acute process. Patient's EKG revealed no acute changes. Patient was given a dose of nitro which did not improve his pain. He was started on a heparin drip prior to being admitted to the hospital by Dr. Alethea Gonzalez for further work-up and treatment at this time. ED Course as of 08/11/22 0224   Thu Aug 11, 2022   0113 Dr. Alana Rapp recommended placing the patient on heparin and admitting for a further cardiac workup. [JS]      ED Course User Index  [JS] Ebonie Donaldo, DO       --------------------------------------------- PAST HISTORY ---------------------------------------------  Past Medical History:  has a past medical history of SCI (spinal cord injury). Past Surgical History:  has no past surgical history on file. Social History:  reports that he has been smoking cigarettes. He has been smoking an average of .5 packs per day. He has never used smokeless tobacco. He reports that he does not drink alcohol and does not use drugs. Family History: family history is not on file. The patients home medications have been reviewed.     Allergies: Erythromycin    -------------------------------------------------- RESULTS -------------------------------------------------    LABS:  Results for orders placed or performed during the hospital encounter of 08/10/22   CBC with Auto Differential   Result Value Ref Range    WBC 5.9 4.5 - 11.5 E9/L    RBC 4.59 3.80 - 5.80 E12/L    Hemoglobin 13.4 12.5 - 16.5 g/dL    Hematocrit 40.5 37.0 - 54.0 %    MCV 88.2 80.0 - 99.9 fL    MCH 29.2 26.0 - 35.0 pg    MCHC 33.1 32.0 - 34.5 %    RDW 13.8 11.5 - 15.0 fL    Platelets 705 855 - 888 E9/L    MPV 9.8 7.0 - 12.0 fL Neutrophils % 48.6 43.0 - 80.0 %    Immature Granulocytes % 0.2 0.0 - 5.0 %    Lymphocytes % 36.7 20.0 - 42.0 %    Monocytes % 11.7 2.0 - 12.0 %    Eosinophils % 2.0 0.0 - 6.0 %    Basophils % 0.8 0.0 - 2.0 %    Neutrophils Absolute 2.87 1.80 - 7.30 E9/L    Immature Granulocytes # 0.01 E9/L    Lymphocytes Absolute 2.17 1.50 - 4.00 E9/L    Monocytes Absolute 0.69 0.10 - 0.95 E9/L    Eosinophils Absolute 0.12 0.05 - 0.50 E9/L    Basophils Absolute 0.05 0.00 - 0.20 E9/L   Comprehensive Metabolic Panel w/ Reflex to MG   Result Value Ref Range    Sodium 130 (L) 132 - 146 mmol/L    Potassium reflex Magnesium 4.0 3.5 - 5.0 mmol/L    Chloride 99 98 - 107 mmol/L    CO2 21 (L) 22 - 29 mmol/L    Anion Gap 10 7 - 16 mmol/L    Glucose 102 (H) 74 - 99 mg/dL    BUN 13 6 - 23 mg/dL    Creatinine 1.0 0.7 - 1.2 mg/dL    GFR Non-African American >60 >=60 mL/min/1.73    GFR African American >60     Calcium 8.6 8.6 - 10.2 mg/dL    Total Protein 6.5 6.4 - 8.3 g/dL    Albumin 3.6 3.5 - 5.2 g/dL    Total Bilirubin 0.2 0.0 - 1.2 mg/dL    Alkaline Phosphatase 80 40 - 129 U/L    ALT 10 0 - 40 U/L    AST 16 0 - 39 U/L   Troponin   Result Value Ref Range    Troponin, High Sensitivity 33 (H) 0 - 11 ng/L   Troponin   Result Value Ref Range    Troponin, High Sensitivity 47 (H) 0 - 11 ng/L   CBC   Result Value Ref Range    WBC 8.6 4.5 - 11.5 E9/L    RBC 4.30 3.80 - 5.80 E12/L    Hemoglobin 12.6 12.5 - 16.5 g/dL    Hematocrit 38.7 37.0 - 54.0 %    MCV 90.0 80.0 - 99.9 fL    MCH 29.3 26.0 - 35.0 pg    MCHC 32.6 32.0 - 34.5 %    RDW 14.0 11.5 - 15.0 fL    Platelets 557 385 - 501 E9/L    MPV 10.0 7.0 - 12.0 fL   APTT   Result Value Ref Range    aPTT 29.9 24.5 - 35.1 sec   EKG 12 Lead   Result Value Ref Range    Ventricular Rate 80 BPM    Atrial Rate 80 BPM    P-R Interval 136 ms    QRS Duration 90 ms    Q-T Interval 418 ms    QTc Calculation (Bazett) 482 ms    P Axis 64 degrees    R Axis 47 degrees    T Axis 71 degrees   EKG 12 Lead   Result Value Ref Range    Ventricular Rate 79 BPM    Atrial Rate 79 BPM    P-R Interval 140 ms    QRS Duration 92 ms    Q-T Interval 408 ms    QTc Calculation (Bazett) 467 ms    P Axis 66 degrees    R Axis 49 degrees    T Axis 76 degrees   EKG 12 Lead   Result Value Ref Range    Ventricular Rate 60 BPM    Atrial Rate 60 BPM    P-R Interval 152 ms    QRS Duration 92 ms    Q-T Interval 434 ms    QTc Calculation (Bazett) 434 ms    P Axis 50 degrees    R Axis 54 degrees    T Axis 79 degrees       RADIOLOGY:  XR CHEST PORTABLE   Final Result   No acute process. EKG:  This EKG is signed and interpreted by me. Rate: 79  Rhythm: Sinus  Interpretation: non-specific EKG  Comparison: was normal and stable as compared to patient's most recent EKG      ------------------------- NURSING NOTES AND VITALS REVIEWED ---------------------------  Date / Time Roomed:  8/10/2022 11:15 PM  ED Bed Assignment:  WILLOW/WILLOW    The nursing notes within the ED encounter and vital signs as below have been reviewed. Patient Vitals for the past 24 hrs:   BP Temp Temp src Pulse Resp SpO2 Height Weight   08/10/22 2331 133/80 -- -- 63 16 97 % -- --   08/10/22 2201 (!) 143/88 97 °F (36.1 °C) Temporal 85 18 96 % 5' 7\" (1.702 m) 165 lb (74.8 kg)       Oxygen Saturation Interpretation: Normal    ------------------------------------------ PROGRESS NOTES ------------------------------------------  Re-evaluation(s):  Time: 0045  Patients symptoms show no change  Repeat physical examination is not changed    Counseling:  I have spoken with the patient and discussed todays results, in addition to providing specific details for the plan of care and counseling regarding the diagnosis and prognosis. Their questions are answered at this time and they are agreeable with the plan of admission.    --------------------------------- ADDITIONAL PROVIDER NOTES ---------------------------------  Consultations:  Time: 0100. Spoke with Dr. Alethea Gonzalez. Discussed case.   They

## 2022-08-11 NOTE — PROCEDURES
[address]                  [address] [place],  [facility]                            CARDIAC CATHETERIZATION    PATIENT NAME: Renato Michelle                     :        1955  MED REC NO:   [id number]                            ROOM:  ACCOUNT NO:   [de-identified]                           ADMIT DATE: 2022  PROVIDER:     Donata Mcburney, MD    DATE OF PROCEDURE:  2022    LEFT HEART CATHETERIZATION, IFR OF THE RCA AND PCI TO MID LAD    INDICATION:  Non-STEMI. REFERRING PROVIDER:  Lisa Alamo MD    AUC indication 8 and score 4. PROCEDURE NOTE:  The patient was transferred from [facility] this  morning to undergo left heart catheterization. After obtaining a signed  consent from the patient, he was brought to the cardiac cath lab in  usual fasting state. Under sterile condition and under local anesthetic  and using conscious sedation, a 6-[place] Terumo Slender sheath was  inserted into his right radial artery. The patient received 5000 units  of intravenous heparin. He also received 200 mcg of intraarterial  nitroglycerin via the right radial sheath. Then, a 5-[place] TIG  diagnostic catheter was advanced over a J-tip wire to the ascending  aorta without difficulty. The left main coronary artery was engaged and  a coronary angiogram was done. Then, the right coronary artery was  engaged and a coronary angiogram was done. This catheter was exchanged  over a guidewire to a 5-[place] pigtail which was advanced into the left  ventricle without difficulty. The left ventricular end-diastolic  pressure was measured. Left ventriculogram was done. There was no  significant gradient across the aortic valve. FINDINGS:  HEMODYNAMICS:  1. Aortic pressure 137/72 mmHg. 2.  Left ventricular end-diastolic pressure 7 mmHg. CORONARY ANATOMY:  LEFT MAIN:  It is a long and large artery with no angiographic stenosis  noted.     LAD:  It is a large artery giving rise to a bifurcating diagonal branch  that it has a dual anatomy distally. The LAD was moderately calcified  in its proximal to mid segment. There was 80% discrete mid stenosis  right before the takeoff of diagonal branch. There was also 50%  discrete mid stenosis in the medial branch of LAD. There was MALU 2 to  3 flow distally. LEFT CIRCUMFLEX:  It is a large artery giving rise to a very small first  obtuse marginal branch, a second small obtuse marginal branch which has  40% discrete proximal stenosis and then a third obtuse marginal branch. RIGHT CORONARY ARTERY:  It is a large, dominant artery giving rise to  two very small RV marginal branches, a large PDA, and a large PLV  branch. The RCA was moderately calcified in its proximal to mid  segment. There was 50% discrete eccentric proximal stenosis. There was  50% to 60% discrete eccentric mid stenosis. There was 60% to 70%  discrete proximal PLV branch stenosis. LEFT VENTRICULOGRAM:  Revealed an ejection fraction of 55% to 60%. No  mitral regurgitation was noted. CORONARY INTERVENTION NOTE:  A 6-Wolof JR4 guide catheter was used to  engage the right coronary artery. Then, a Daly OmniWire was zeroed  and normalized and was advanced to the distal RCA with no difficulty. IFR obtained twice was 0.99. The wire was then advanced into the distal  PLV branch and iFR obtained was 0.97 twice. The wire was pulled out. The guide catheter was exchanged over a guidewire to a 6-Wolof EBU 3.5  guide catheter which was used to engage the left main coronary artery. Then, the patient received an extra 2000 units of intravenous heparin. Then, a Runthrough wire was advanced into the diagonal branch without  difficulty. A second Runthrough wire could not be advanced into the  distal LAD. Then, a BMW wire was advanced into the distal LAD with some  difficulty.   Then, a 2.5 x 12 Euphora balloon was inflated twice at  nominal pressure at the mid LAD. Then, a 3.0 x 26 Resolute Mercer stent  was deployed at nominal pressure at the mid LAD with 0% residual  stenosis and MALU-3 flow distally. At the end of the PCI, both wires  were pulled out. The guide catheter was pulled out. The Terumo Slender  sheath was pulled out and a Vasc Band was applied over the right radial  artery with good hemostasis. The patient tolerated the procedure very  well and no complications were noted. No significant blood loss  occurred during the procedure. The patient's ACT was 287. He received  two tablets of crushed Brilinta. IMPRESSION:  1.  80% discrete mid LAD stenosis, status post successful PCI using  drug-eluting stent. 2.  Moderate proximal and mid RCA as well as proximal PLV branch,  non-physiologically significant. 3.  LVEDP 7 mmHg. 4.  Ejection fraction 55% to 60%. RECOMMENDATIONS;  1. DAPT. 2.  Aggressive medical therapy. 3.  Smoking cessation. 4.  Cardiac rehab post hospital discharge. PROCEDURE START TIME:  10:51 a.m. PROCEDURE END TIME:  11:55 a.m. CONTRAST VOLUME:  160 mL of Isovue. FLUOROSCOPY TIME:  14.4 minutes. CONSCIOUS SEDATION:  1 mg of intravenous Versed and 50 mcg of  intravenous fentanyl.         Oly Gould MD    D: 08/11/2022 12:24:17       T: 08/11/2022 12:27:30     [email or web address]  Job#: [id number]     Doc#: [id number]    CC:

## 2022-08-11 NOTE — CONSULTS
Inpatient Cardiology Consultation      Reason for Consult:  Elevated troponin     Consulting Physician: Dr. Seema Lees    Requesting Physician:  Dr. Neri Medina    Date of Consultation: 8/11/2022    HISTORY OF PRESENT ILLNESS:   Mr. Sonia Cervantes is a 75year old male who is new to [facility] cardiology physicians. PMHx: Hx of Spinal cord injury/traumatic brain injury (2005), Anxiety/Depression, current smoker and strong family history significant for premature CAD. Patient reported that he was in his normal state of health prior to 8/8/2022. 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 He stated on 8/9/2022 he was at home watching TV when he suddenly developed a numbness and tingling sensation in his left hand radiating up into his left shoulder and into the middle of his chest.  He stated that he felt a \"squeezing\" 5/10 discomfort in the middle of his chest associated with diaphoresis and slight nausea without emesis. His symptoms lasted for approximately 1 hour and went away after he took aspirin 81 mg. The following day he was at work again ambulating up and down ladders. He stated that when he was in his office sitting down he again suddenly had left arm numbness and tingling with radiating squeezing sensation in the middle of his chest, associated with diaphoresis, slight nausea and shortness of breath. His son was present and drove him to [facility] on 8/10/2022. Patient stated that his chest discomfort lasted 2 to 3 hours and went away on its own after receiving nitroglycerin sublingual x1. Upon arrival to the ED: /88, heart rate 85, afebrile, 96% on room air. Labs: Sodium 130, potassium 4.0, BUN 13, creatinine 1.0. High-sensitivity troponin 33, 42, 53. WBC 5.9, H/H stable, platelet count 645.  Chest x-ray: No acute process. Initial EKG: Normal sinus rhythm, possible left atrial enlargement, rate 79 bpm, QT/QTc 408/467 ms. ER medications: Nitroglycerin sublingual, nitroglycerin 0.5 inch, IV heparin drip. Patient was admitted to a telemetry monitored unit for further evaluation and management. Overnight patient denied any reoccurring chest pain or shortness of breath. VSS. Telemetry monitor showing SB/SR. Please note: past medical records were reviewed per electronic medical record (EMR) - see detailed reports under Past Medical/ Surgical History. Past Medical History:    Hx of Spinal cord injury /traumatic brain injury due to an industrial injury (2005) patient has residual paralysis symptoms of right upper extremity and right lower extremity. Hx of HTN (previously declining antihypertensive medications). Anxiety/Depression  Current smoker: 1.5 PPD x45 years. No known alcohol or illicit drug use   Strong family history of premature CAD (father, sister). Admission: 7/25/2022 with complaints of a drug overdose (OTC Gummies of THC). Reported \"normal stress test\" ordered by PCP ~ 3-4 years ago  (report unavailable at this time). No prior history of HLD, diabetes mellitus, thyroid disorder, CVA or known CAD. Medications Prior to admit:  Prior to Admission medications    Medication Sig Start Date End Date Taking? Authorizing Provider   escitalopram (LEXAPRO) 20 MG tablet Take 20 mg by mouth in the morning.     Historical Provider, MD       Current Medications:    Current Facility-Administered Medications: acetaminophen (TYLENOL) tablet 650 mg, 650 mg, Oral, Q6H PRN  morphine (PF) injection 1 mg, 1 mg, IntraVENous, Q2H PRN  escitalopram (LEXAPRO) tablet 20 mg, 20 mg, Oral, Daily  aspirin chewable tablet 81 mg, 81 mg, Oral, Daily  atorvastatin (LIPITOR) tablet 80 mg, 80 mg, Oral, Daily  heparin (porcine) injection 3,230 Units, 60 Units/kg, IntraVENous, PRN  heparin (porcine) injection 1,610 Units, 30 Units/kg, IntraVENous, PRN  heparin 25,000 units in dextrose 5% 250 mL (premix) infusion, 5-30 Units/kg/hr, IntraVENous, Continuous    Allergies:  Erythromycin    Social History:    Current smoker: 1.5 PPD x45 years. Denies alcohol and illicit drug use. Lives at home with his son. Patient using walker or cane for ambulation. Family History: Mother: History of bowel obstruction, living. Father: History of MI (age 79) with history of bypass surgery and CKD. Younger sister: History of thyroid disorder, hypertension, poorly controlled diabetes mellitus. Older Sister: History of diabetes mellitus and questionable valvular heart disease (currently being worked up by cardiology) with reported recurrent syncope. REVIEW OF SYSTEMS:     Constitutional: + fatigue. Denies fevers, chills or night sweats  Eyes: Denies visual changes or drainage  ENT: Denies headaches or hearing loss. No mouth sores or sore throat. No epistaxis   Cardiovascular: See HPI. Respiratory: Denies PUGH, cough, orthopnea or PND. No hemoptysis   Gastrointestinal: Denies hematemesis or anorexia. No hematochezia or melena    Genitourinary: Denies urgency, dysuria or hematuria. Musculoskeletal: Denies gait disturbance, weakness or joint complaints  Integumentary: Denies rash, hives or pruritis   Neurological: Denies dizziness, headaches or seizures. No numbness or tingling  Psychiatric: Denies anxiety or depression. Endocrine: Denies temperature intolerance. No recent weight change. .  Hematologic/Lymphatic: Denies abnormal bruising or bleeding. No swollen lymph nodes    PHYSICAL EXAM:   /75   Pulse 60   Temp 97.7 °F (36.5 °C) (Oral)   Resp 16   Ht 5' 7\" (1.702 m)   Wt 118 lb 9.6 oz (53.8 kg)   SpO2 97%   BMI 18.58 kg/m²   CONST:  Well developed, well nourished middle aged [de-identified] male who appears of stated age. Awake, alert and cooperative. No apparent distress.    HEENT:   Head- Normocephalic, atraumatic   Eyes- Conjunctivae pink, about an hour. Not exertional.  Last night in his lazy boy developed similar symptoms associate with diaphoresis that was more intense and lasted several hours. Came to the ER. Troponin initially 33 -> 53 ng/L and trending down. Mild residual chest pain this morning. No significant EKG changes. Physical Exam:  BP (!) 145/87   Pulse 58   Temp 97.8 °F (36.6 °C) (Oral)   Resp 14   Ht 5' 7\" (1.702 m)   Wt 118 lb 9.6 oz (53.8 kg)   SpO2 96%   BMI 18.58 kg/m²   General appearance: Awake, alert, no acute respiratory distress  Skin: Intact, no rash  ENMT: Moist mucous membranes  Neck: Supple, no carotid bruits. Normal jugular venous pressure  Lungs: Clear to auscultation bilaterally. No wheezes, rales, or rhonchi  Cardiac: Regular rhythm with a normal rate, normal S1&S2, no murmurs apparent  Abdomen: Soft, positive bowel sounds, nontender  Extremities: Moves all extremities x 4, no lower extremity edema  Neurologic: No focal motor deficits apparent, normal mood and affect    Telemetry: Sinus bradycardia  EKG: Sinus without acute EKG changes    Impression:   Unstable angina/NSTEMI. Minimal elevation high-sensitivity troponin with rise and fall pattern  Heavy tobacco abuse  Untreated hypertension  Family history CAD    Recommendations:    Treat as ACS  Given risk factors, would not be reassured by negative stress test  Recommend invasive coronary angiography for definitive delineation of coronary anatomy  Transferred to [facility] for heart cath today  Continue heparin, aspirin, statin  Hold off on beta-blocker due to bradycardia  Aggressive risk factor modification including smoking cessation recommended regardless of outcome of above    Risks and benefits of left heart catheterization explained to patient, including risk of MI, CVA, death, bleeding complications, vascular injury, renal failure requiring dialysis, and requiring emergency surgery.   Possible outcomes including need for medical management, PCI, bypass surgery explained to patient. Patient voiced understanding and agrees to proceed. AUC criteria 8/4    Thank you for the consultation. Please do not hesitate to call with questions.     Dequan Small MD, [facility] Cardiology

## 2022-08-11 NOTE — PROGRESS NOTES
Called Dr. Cody Horowitz AS regarding heparin drip order ;left message with answering service staff. Dr. Raquel Rodrigues called back, updated on patient's weight, ok to modify heparin drip order.

## 2022-08-11 NOTE — PLAN OF CARE
Problem: Discharge Planning  Goal: Discharge to home or other facility with appropriate resources  Outcome: Progressing  Flowsheets (Taken 8/11/2022 1612)  Discharge to home or other facility with appropriate resources:   Identify barriers to discharge with patient and caregiver   Identify discharge learning needs (meds, wound care, etc)   Refer to discharge planning if patient needs post-hospital services based on physician order or complex needs related to functional status, cognitive ability or social support system   Arrange for needed discharge resources and transportation as appropriate     Problem: ABCDS Injury Assessment  Goal: Absence of physical injury  Outcome: Progressing

## 2022-08-11 NOTE — PROGRESS NOTES
Spoke with Cath lab at [facility] ([facility]) - will setup transport to get patient downtown - ETA with PAS within the hour, cath lab updated. Leave Heparin gtt running, per cardio if able to obtain labs prior ok, if not can get them downtown.     Electronically signed by Michelle Pathak RN on 8/11/2022 at 8:13 AM

## 2022-08-11 NOTE — CARE COORDINATION
Social Work/Case Management Transition of Care Planning ([place] [phone]): Met with patient at bedside. He was admitted to the ED with NSTEMI. He is alert and oriented x4. He resides in a 2 story home with his bedroom and bathroom on the second floor. There are 2 ALPHONSO with bilateral rails. He lives with his son, Amadou Merino (32). He has 2 other children in the area but has very limited contact with them. Patient is working full time at [employer]. He does not have any DME in the home. He has not had home care. PCP is Dr. Estephania Deras. Pharmacy is [facility] in [facility]. Plan is for patient to discharge to home with no needs. He stated his son will transport home.   JENNIFER Gracia  8/11/2022

## 2022-08-11 NOTE — ED NOTES
Department of Emergency Medicine  FIRST PROVIDER TRIAGE NOTE             Independent MLP           8/10/22  9:57 PM EDT    Date of Encounter: 8/10/22   MRN: [id number]      HPI: Leigha Velazquez is a 75 y.o. male who presents to the ED for Arm Pain (Pt presents with pain in his left arm that radiates to his chest.  Pt states this began yesterday. Pt states some minor sob.  )       ROS: Negative for back pain or fever. PE: Gen Appearance/Constitutional: alert  Musculoskeletal: moves all extremities x 4     Initial Plan of Care: All treatment areas with department are currently occupied. Plan to order/Initiate the following while awaiting opening in ED: labs, EKG, and imaging studies.   Initiate Treatment-Testing, Proceed toTreatment Area When Bed Available for ED Attending/RADUP to Continue Care    Electronically signed by Charlie Rain PA-C   DD: 8/10/22       Charlie Rain PA-C  08/10/22 9174

## 2022-08-12 ENCOUNTER — TELEPHONE (OUTPATIENT)
Dept: CARDIOLOGY CLINIC | Age: 67
End: 2022-08-12

## 2022-08-12 VITALS
HEIGHT: 67 IN | BODY MASS INDEX: 24.89 KG/M2 | TEMPERATURE: 97.3 F | OXYGEN SATURATION: 97 % | HEART RATE: 57 BPM | RESPIRATION RATE: 18 BRPM | DIASTOLIC BLOOD PRESSURE: 92 MMHG | SYSTOLIC BLOOD PRESSURE: 166 MMHG | WEIGHT: 158.6 LBS

## 2022-08-12 PROBLEM — I21.4 NON-STEMI (NON-ST ELEVATED MYOCARDIAL INFARCTION) (HCC): Status: ACTIVE | Noted: 2022-08-12

## 2022-08-12 LAB
ALBUMIN SERPL-MCNC: 3.5 G/DL (ref 3.5–5.2)
ALP BLD-CCNC: 78 U/L (ref 40–129)
ALT SERPL-CCNC: 9 U/L (ref 0–40)
ANION GAP SERPL CALCULATED.3IONS-SCNC: 8 MMOL/L (ref 7–16)
AST SERPL-CCNC: 16 U/L (ref 0–39)
BILIRUB SERPL-MCNC: 0.5 MG/DL (ref 0–1.2)
BUN BLDV-MCNC: 10 MG/DL (ref 6–23)
CALCIUM SERPL-MCNC: 8.7 MG/DL (ref 8.6–10.2)
CHLORIDE BLD-SCNC: 104 MMOL/L (ref 98–107)
CO2: 23 MMOL/L (ref 22–29)
CREAT SERPL-MCNC: 0.9 MG/DL (ref 0.7–1.2)
GFR AFRICAN AMERICAN: >60
GFR NON-AFRICAN AMERICAN: >60 ML/MIN/1.73
GLUCOSE BLD-MCNC: 101 MG/DL (ref 74–99)
MAGNESIUM: 2 MG/DL (ref 1.6–2.6)
POTASSIUM SERPL-SCNC: 4.5 MMOL/L (ref 3.5–5)
SODIUM BLD-SCNC: 135 MMOL/L (ref 132–146)
TOTAL PROTEIN: 6.4 G/DL (ref 6.4–8.3)

## 2022-08-12 PROCEDURE — 6370000000 HC RX 637 (ALT 250 FOR IP): Performed by: NURSE PRACTITIONER

## 2022-08-12 PROCEDURE — 36415 COLL VENOUS BLD VENIPUNCTURE: CPT

## 2022-08-12 PROCEDURE — 2580000003 HC RX 258: Performed by: INTERNAL MEDICINE

## 2022-08-12 PROCEDURE — 6370000000 HC RX 637 (ALT 250 FOR IP)

## 2022-08-12 PROCEDURE — 80053 COMPREHEN METABOLIC PANEL: CPT

## 2022-08-12 PROCEDURE — 6370000000 HC RX 637 (ALT 250 FOR IP): Performed by: INTERNAL MEDICINE

## 2022-08-12 PROCEDURE — 99233 SBSQ HOSP IP/OBS HIGH 50: CPT | Performed by: INTERNAL MEDICINE

## 2022-08-12 PROCEDURE — APPSS30 APP SPLIT SHARED TIME 16-30 MINUTES

## 2022-08-12 PROCEDURE — 83735 ASSAY OF MAGNESIUM: CPT

## 2022-08-12 RX ORDER — CARVEDILOL 6.25 MG/1
6.25 TABLET ORAL 2 TIMES DAILY WITH MEALS
Qty: 60 TABLET | Refills: 3 | Status: SHIPPED | OUTPATIENT
Start: 2022-08-12 | End: 2022-09-28 | Stop reason: SDUPTHER

## 2022-08-12 RX ORDER — CARVEDILOL 6.25 MG/1
6.25 TABLET ORAL 2 TIMES DAILY WITH MEALS
Status: DISCONTINUED | OUTPATIENT
Start: 2022-08-12 | End: 2022-08-12 | Stop reason: HOSPADM

## 2022-08-12 RX ORDER — ATORVASTATIN CALCIUM 80 MG/1
80 TABLET, FILM COATED ORAL DAILY
Qty: 30 TABLET | Refills: 3 | Status: SHIPPED | OUTPATIENT
Start: 2022-08-13 | End: 2022-09-28 | Stop reason: SDUPTHER

## 2022-08-12 RX ORDER — ASPIRIN 81 MG/1
81 TABLET, CHEWABLE ORAL DAILY
Qty: 30 TABLET | Refills: 3 | Status: SHIPPED | OUTPATIENT
Start: 2022-08-13 | End: 2022-09-28 | Stop reason: SDUPTHER

## 2022-08-12 RX ADMIN — ASPIRIN 81 MG: 81 TABLET, CHEWABLE ORAL at 09:18

## 2022-08-12 RX ADMIN — SODIUM CHLORIDE, PRESERVATIVE FREE 10 ML: 5 INJECTION INTRAVENOUS at 09:18

## 2022-08-12 RX ADMIN — CARVEDILOL 6.25 MG: 6.25 TABLET, FILM COATED ORAL at 11:45

## 2022-08-12 RX ADMIN — ATORVASTATIN CALCIUM 80 MG: 80 TABLET, FILM COATED ORAL at 09:18

## 2022-08-12 RX ADMIN — ESCITALOPRAM OXALATE 20 MG: 10 TABLET ORAL at 09:18

## 2022-08-12 RX ADMIN — TICAGRELOR 90 MG: 90 TABLET ORAL at 09:18

## 2022-08-12 NOTE — PROGRESS NOTES
Discharged pt to home w/son. Ambulated about in hallway without difficulty. No c/o or distress.  Heart monitor sanitized and returned to nurses station

## 2022-08-12 NOTE — PROGRESS NOTES
CLINICAL PHARMACY NOTE: MEDS TO BEDS    Total # of Prescriptions Filled: 4   The following medications were delivered to the patient:  Lipitor 80mg  Brilinta 90mg  Coreg 6.25mg  Aspirin 81mg    Additional Documentation:  Delivered to patient 8/12/22 3pm

## 2022-08-12 NOTE — DISCHARGE SUMMARY
Admitted less than 48 hours  Please see history and physical from today  Follow-up with cardiology as scheduled  Follow-up with PCP within 1 week

## 2022-08-12 NOTE — PROGRESS NOTES
Inpatient Cardiology Progress note     PATIENT IS BEING FOLLOWED FOR: NSTEMI s/p PCI with MYNOR to LAD    Leticia Edwards is a 75 y.o. male who is now known to Dr. Christine Quevedo cardiology. SUBJECTIVE: Patient sitting semi-Piper in hospital bed on room air. Patient reports he feels great today and is ready to go home. Patient denies chest pain, SOB, PUGH, nausea, headache, diaphoresis, dizziness, orthopnea, or PND. OBJECTIVE: Patient is alert and oriented, speaking full sentences, and is in no apparent distress. Patient's right radial access site without ecchymosis/erythema and MSPs distally intact. Patient updated about continued hypertension while in hospital and need to start antihypertensive medication. Patient is agreeable. Patient also educated about DAPT therapy and needing for follow-up with cardiology and cardiac rehab. Patient is agreeable to both. All questions answered satisfactorily at this time. ROS:  Consist: Denies fevers, chills or night sweats  Heart: Denies chest pain, palpitations, lightheadedness, dizziness or syncope  Lungs: Denies SOB, cough, wheezing, orthopnea or PND  GI: Denies abdominal pain, vomiting or diarrhea    PHYSICAL EXAM:   BP (!) 164/88   Pulse 62   Temp 97.6 °F (36.4 °C) (Temporal)   Resp 16   Ht 5' 7\" (1.702 m)   Wt 118 lb (53.5 kg)   SpO2 96%   BMI 18.48 kg/m²    B/P Range last 24 hours: Systolic (47IBV), ERE:201 , Min:124 , WWR:947    Diastolic (84TJK), IAU:26, Min:63, Max:99    CONST: Well developed, well nourished who appears stated age. Awake, alert and cooperative. No apparent distress  HEENT:   Head- Normocephalic, atraumatic   Eyes- Conjunctivae pink, anicteric  Throat- Oral mucosa pink and moist  Neck-  No stridor, trachea midline, no jugular venous distention. No carotid bruit  CHEST: Chest symmetrical and non-tender to palpation.  No accessory muscle use or intercostal retractions  RESPIRATORY:  Lung sounds - clear throughout fields 08/10/22  2158 08/10/22  2336 08/11/22  0328 08/11/22  0840   TROPHS 33* 47* 53* 45*     FASTING LIPID PANEL:  Lab Results   Component Value Date/Time    CHOL 164 08/11/2022 08:40 AM    HDL 43 08/11/2022 08:40 AM    LDLCALC 105 08/11/2022 08:40 AM    TRIG 81 08/11/2022 08:40 AM     LIVER PROFILE:  Recent Labs     08/10/22  2158 08/12/22  0546   AST 16 16   ALT 10 9   LABALBU 3.6 3.5       CXR: No acute process 8/10/2022    Telemetry: Sinus bradycardia with rare PVCs, 58 heart rate  12 lead EKG: Sinus bradycardia, vent rate 54, WV interval 150, QRS duration 88, QTc 455. [facility] 8/11/2022:  IMPRESSION:  1.  80% discrete mid LAD stenosis, status post successful PCI using  drug-eluting stent. 2.  Moderate proximal and mid RCA as well as proximal PLV branch,  non-physiologically significant. 3.  LVEDP 7 mmHg. 4.  Ejection fraction 55% to 60%. RECOMMENDATIONS;  1. DAPT. 2.  Aggressive medical therapy. 3.  Smoking cessation. 4.  Cardiac rehab post hospital discharge. ASSESSMENT:   CAD: Status post PCI with MYNOR to mid LAD  with none physiologically significant moderate  proximal and mid RCA as well as proximal PLB branch stenosis. EF 55-60%. Initiated DAPT. Hypertension: Moderately elevated and currently untreated  Anxiety/depression  Tobacco abuse, patient reports he is going to stop smoking at this juncture. History TBI/spinal cord injury     PLAN:  Continue hospital medications at discharge including aspirin 81 mg daily, Lipitor 80 mg daily, Brilinta 90 mg twice daily. Start Coreg 6.25 mg twice daily. Consider adding Norvasc if needed for blood pressure control. Aggressive risk modification education including smoking cessation, exercise, healthy diet. Patient is stable for discharge from cardiology standpoint after being seen by cardiac rehab. Patient to follow-up with Dr. Stefany Ellison in 3-4 weeks. cardiac rehab after hospital discharge. Case and subsequent orders discussed with Dr. Chandra Garcia.   Cardiology to sign off at this time. Please call with any questions/concerns. Electronically signed by ASAF Pickett CNP on 8/12/2022 at 10:12 AM    I agree with the above assessment and plan made in collaboration with ASAF Pickett CNP.

## 2022-08-12 NOTE — CONSULTS
Met with patient and discussed that their physician has ordered a referral to our outpatient Phase II Cardiac Rehabilitation program. Reviewed the benefits of cardiac rehabilitation based on their diagnosis and personal risk factors. Patient demonstrates strong interest in Cardiac Rehabilitation at this time. Cardiac Rehabilitation brochure provided to patient/family. The Cardiac Rehabilitation Program has been provided the patient's referral information and pertinent patient details and history. The patient may call [address] [place] [address] at [phone] for additional information or questions. Contact information for [address] [place], [place] [address]on [place] and other choices close to the patient's residence have been provided in the discharge instructions so that the patient may call and schedule an appointment when cleared by their physician.  Thank you for the referral.

## 2022-08-12 NOTE — DISCHARGE INSTRUCTIONS
Coronary Angioplasty: What to Expect at [facility]     Coronary angioplasty is a procedure that is used to open a narrowed or blocked coronary artery. It may also be called a percutaneous coronary intervention (PCI). The doctor opened your narrowed or blocked artery by putting a thin tube, called a catheter, into your heart through a blood vessel. The catheter was inserted into the blood vessel in your groin or wrist. The doctor may haveplaced a small tube, called a stent, in the artery. Your groin or wrist may have a bruise and feel sore for a few days after the procedure. You can do light activities around the house. But don't do anythingstrenuous until your doctor says it is okay. This may be for several days. This care sheet gives you a general idea about how long it will take for you to recover. But each person recovers at a different pace. Follow the steps belowto get better as quickly as possible. How can you care for yourself at home? Activity    If the doctor gave you a sedative: For 24 hours, don't do anything that requires attention to detail, such as going to work, making important decisions, or signing any legal documents. It takes time for the medicine's effects to completely wear off. For your safety, do not drive or operate any machinery that could be dangerous. Wait until the medicine wears off and you can think clearly and react easily. Do not do strenuous exercise and do not lift, pull, or push anything heavy until your doctor says it is okay. This may be for several days. You can walk around the house and do light activity, such as cooking. If the catheter was placed in your groin, try not to walk up stairs for the first couple of days. If the catheter was placed in your arm near your wrist, do not bend your wrist deeply for the first couple of days. Be careful using your hand to get into and out of a chair or bed.      Carry your stent identification card with you at all times. If your doctor recommends it, get more exercise. Walking is a good choice. Bit by bit, increase the amount you walk every day. Try for at least 30 minutes on most days of the week. If you haven't been set up with a cardiac rehab program, talk to your doctor about whether rehab is right for you. Cardiac rehab includes supervised exercise. It also includes help with diet and lifestyle changes and emotional support. Diet    Drink plenty of fluids to help your body flush out the dye. If you have kidney, heart, or liver disease and have to limit fluids, talk with your doctor before you increase the amount of fluids you drink. Keep eating a heart-healthy diet that has lots of fruits, vegetables, and whole grains. If you have not been eating this way, talk to your doctor. You also may want to talk to a dietitian. This expert can help you to learn about healthy foods and plan meals. Medicines    Your doctor will tell you if and when you can restart your medicines. Your doctor will also give you instructions about taking any new medicines. If you take aspirin or some other blood thinner, ask your doctor if and when to start taking it again. Make sure that you understand exactly what your doctor wants you to do. Your doctor will prescribe blood-thinning medicines. You will likely take aspirin plus another antiplatelet, such as clopidogrel (Plavix). It is very important that you take these medicines exactly as directed. These medicines help keep the coronary artery open and reduce your risk of a heart attack. Call your doctor if you think you are having a problem with your medicine. Care of the catheter site    For 1 or 2 days, keep a bandage over the spot where the catheter was inserted. The bandage probably will fall off in this time. Put ice or a cold pack on the area for 10 to 20 minutes at a time to help with soreness or swelling.  Put a thin cloth between the ice and your skin.     You may shower 24 to 48 hours after the procedure, if your doctor okays it. Pat the incision dry. Do not soak the catheter site until it is healed. Don't take a bath for 1 week, or until your doctor tells you it is okay. Watch for bleeding from the site. A small amount of blood (up to the size of a quarter) on the bandage can be normal.     If you are bleeding, lie down and press on the area for 15 minutes to try to make it stop. If the bleeding does not stop, call your doctor or seek immediate medical care. Follow-up care is a key part of your treatment and safety. Be sure to make and go to all appointments, and call your doctor if you are having problems. It's also a good idea to know your test results and keep alist of the medicines you take. When should you call for help? Call 911 anytime you think you may need emergency care. For example, call if:    You passed out (lost consciousness). You have severe trouble breathing. You have sudden chest pain and shortness of breath, or you cough up blood. You have symptoms of a heart attack, such as:  Chest pain or pressure. Sweating. Shortness of breath. Nausea or vomiting. Pain that spreads from the chest to the neck, jaw, or one or both shoulders or arms. Dizziness or lightheadedness. A fast or uneven pulse. After calling 911, chew 1 adult-strength aspirin. Wait for an ambulance. Do not try to drive yourself. You have been diagnosed with angina, and you have angina symptoms that do not go away with rest or are not getting better within 5 minutes after you take one dose of nitroglycerin. Call your doctor now or seek immediate medical care if:    You are bleeding from the area where the catheter was put in your artery. You have a fast-growing, painful lump at the catheter site. You have signs of infection, such as: Increased pain, swelling, warmth, or redness. Red streaks leading from the catheter site.   Pus normal. That makes room for more oxygen. With more oxygen in your body, you may notice that you have more energy than when you smoked. After 2 weeks  Your lungs start to work better. Your risk of heart attack starts to drop. After 1 month  When your lungs are clear, you cough less and breathe deeper, so it's easier to be active. Your sense of taste and smell return. That means you can enjoy food more than you have since you started smoking. Over the years  Over the years, your risks of heart disease, heart attack, and stroke are lower. After 10 years, your risk of dying from lung cancer is cut by about half. And your risk for many other types of cancer is lower too. How would quitting help others in your life? When you quit smoking, you improve the health of everyone who now breathes inyour smoke. Their heart, lung, and cancer risks drop, much like yours. They are sick less. For babies and small children, living smoke-free means they're less likely to have ear infections, pneumonia, and bronchitis. If you're a woman who is or will be pregnant someday, quitting smoking means a healthier . Children who are close to you are less likely to become adult smokers. Where can you learn more? Go to https://Zify.Xelerated. org and sign in to your Synerscope account. Enter [id number] [id number] [id number] 11 in the [facility] box to learn more about \"Learning About Benefits From Quitting Smoking. \"     If you do not have an account, please click on the \"Sign Up Now\" link. Current as of: 2021               Content Version: 13.3   Healthwise, Incorporated. Care instructions adapted under license by [facility] ([facility]). If you have questions about a medical condition or this instruction, always ask your healthcare professional. [place] [address] any warranty or liability for your use of this information.            Heart-Healthy Diet: Care Instructions  Your Care Instructions     A heart-healthy diet has lots of vegetables, fruits, nuts, beans, and whole grains, and is low in salt. It limits foods that are high in saturated fat, such as meats, cheeses, and fried foods. It may be hard to change your diet,but even small changes can lower your risk of heart attack and heart disease. Follow-up care is a key part of your treatment and safety. Be sure to make and go to all appointments, and call your doctor if you are having problems. It's also a good idea to know your test results and keep alist of the medicines you take. How can you care for yourself at home? Watch your portions  Use food labels to learn what the recommended servings are for the foods you eat. Eat only the number of calories you need to stay at a healthy weight. If you need to lose weight, eat fewer calories than your body burns (through exercise and other physical activity). Eat more fruits and vegetables  Eat a variety of fruit and vegetables every day. Dark green, deep orange, red, or yellow fruits and vegetables are especially good for you. Examples include spinach, carrots, peaches, and berries. Keep carrots, celery, and other veggies handy for snacks. Buy fruit that is in season and store it where you can see it so that you will be tempted to eat it. Cook dishes that have a lot of veggies in them, such as stir-fries and soups. Limit saturated fat  Read food labels, and try to avoid saturated fats. They increase your risk of heart disease. Use olive or canola oil when you cook. Bake, broil, grill, or steam foods instead of frying them. Choose lean meats instead of high-fat meats such as hot dogs and sausages. Cut off all visible fat when you prepare meat. Eat fish, skinless poultry, and meat alternatives such as soy products instead of high-fat meats. Soy products, such as tofu, may be especially good for your heart. Choose low-fat or fat-free milk and dairy products.   Eat foods high in fiber  Eat a variety of grain products every day. Include whole-grain foods that have lots of fiber and nutrients. Examples of whole-grain foods include oats, whole wheat bread, and brown rice. Buy whole-grain breads and cereals, instead of white bread or pastries. Limit salt and sodium  Limit how much salt and sodium you eat to help lower your blood pressure. Taste food before you salt it. Add only a little salt when you think you need it. With time, your taste buds will adjust to less salt. Eat fewer snack items, fast foods, and other high-salt, processed foods. Check food labels for the amount of sodium in packaged foods. Choose low-sodium versions of canned goods (such as soups, vegetables, and beans). Limit sugar  Limit drinks and foods with added sugar. These include candy, desserts, and soda pop. Limit alcohol  Limit alcohol to no more than 2 drinks a day for men and 1 drink a day for women. Too much alcohol can cause health problems. When should you call for help? Watch closely for changes in your health, and be sure to contact your doctor if:    You would like help planning heart-healthy meals. Where can you learn more? Go to https://Insurance Noodleperonneweb.healthTalkyLand. org and sign in to your Funinhand account. Enter V137 in the [facility] box to learn more about \"Heart-Healthy Diet: Care Instructions. \"     If you do not have an account, please click on the \"Sign Up Now\" link. Current as of: September 8, 2021               Content Version: 13.3  © 2006-2022 Staff Ranker. Care instructions adapted under license by [facility] ([facility]). If you have questions about a medical condition or this instruction, always ask your healthcare professional. [place] [address] any warranty or liability for your use of this information.           Cardiac Rehabilitation: Discharge instructions        Cardiac rehabilitation is a program for people who have a heart problem, such as a heart attack, coronary stent placed, heart failure, or a heart valve disease. The program includes exercise, lifestyle changes, education, and emotional support. Cardiac rehab can help you improve the quality of your life through better overall health. It can help you lose weight and feel better about yourself. On your cardiac rehab team, you may have your doctor, a nurse specialist, an exercise physiologist, and a dietitian. They will design your cardiac rehab program specifically for you. You will learn how to reduce your risk for heart problems, how to manage stress, and how to eat a heart-healthy diet. By the end of the program, you will be ready to maintain a healthier lifestyle on your own. Follow-up care is a key part of your treatment and safety. Be sure to make and go to all appointments, and call your doctor if you are having problems. It's also a good idea to know your test results and keep a list of the medicines you take. Please call to schedule your first appointment once you have been cleared by your cardiologist to attend Phase II Outpatient Cardiac Rehabilitation. Cardiac Rehabilitation Options:  [address] Cardiology Services  [place], [address]                                                                              [address] [place] [address] [phone]                                                                                         [facility], [address] [place] [address]  Hours: M/W/F 7am-7pm & T/Th 7am-12pm                                                  [phone]                                                                                                                          [phone] [address]  Cardiac Rehab  [facility]. [address] [facility]  P- [phone]                                                                                         Cardiac Rehabilitation  Hours: M/W/F 7am-6pm                                                                                [place], [address]  [facility]                                                          [phone]  Cardiac Rehabilitation                                                                                   [phone]  [address] [place] [address] Dr, [address]                                                                                        [facility]  [phone]                                                                                          Cardiac Rehabilitation  [phone]                                                                                          [facility].  Jennifer [address] [place] [address]                                                                                                                        [phone]                                                                                                                        [phone]

## 2022-08-12 NOTE — CARE COORDINATION
8/12 Update CM note. Discharge order noted. Patient to discharge home with no needs and his son will provide transportation.     KENYA VarmaN, RN  [facility] Case Management   Cell: [phone]

## 2022-08-12 NOTE — PLAN OF CARE
Problem: Discharge Planning  Goal: Discharge to home or other facility with appropriate resources  Outcome: Completed     Problem: ABCDS Injury Assessment  Goal: Absence of physical injury  Outcome: Completed     Problem: Nutrition Deficit:  Goal: Optimize nutritional status  Outcome: Completed

## 2022-08-12 NOTE — H&P
[facility]  History and Physical      CHIEF COMPLAINT:    No chief complaint on file. Patient of Silas Higginbotham DO presents with:  <principal problem not specified>    History of Present Illness:   Patient is a 25-year-old male who presented initially to [facility], ER for chest pain. Patient was initially admitted inpatient at [place] [address] and evaluated by cardiology for atypical chest pain given his risk factors was recommended patient be transferred to [facility] for a cardiac catheterization. Patient is s/p left heart catheterization in which PCI with MYNOR to the mid LAD was completed. Patient's labs are relatively unremarkable. Patient is admitted to intermediate telemetry unit for further work-up and treatment. Patient resting comfortably no apparent acute distress. Has been up and ambulating in the room without any distress chest pain or shortness of breath      REVIEW OF SYSTEMS:  Pertinent negatives are above in HPI. 10 point ROS otherwise negative. Past Medical History:   Diagnosis Date    SCI (spinal cord injury)          No past surgical history on file. Medications Prior to Admission:    Medications Prior to Admission: escitalopram (LEXAPRO) 20 MG tablet, Take 20 mg by mouth in the morning. Note that the patient's home medications were reviewed and the above list is accurate to the best of my knowledge at the time of the exam.    Allergies:    Erythromycin    Social History:    reports that he has been smoking cigarettes. He has been smoking an average of .5 packs per day. He has never used smokeless tobacco. He reports that he does not drink alcohol and does not use drugs. Family History:   family history is not on file.       PHYSICAL EXAM:    Vitals:  BP (!) 164/88   Pulse 62   Temp 97.6 °F (36.4 °C) (Temporal)   Resp 16   Ht 5' 7\" (1.702 m)   Wt 118 lb (53.5 kg)   SpO2 96%   BMI 18.48 kg/m²       General appearance: NAD, conversant  Eyes: Sclerae anicteric, PERRLA  HEENT: AT/NC, MMM  Neck: FROM, supple, no thyromegaly  Lymph: No cervical / supraclavicular lymphadenopathy  Lungs: Clear to auscultation, WOB normal  CV: RRR, no MRGs, no lower extremity edema  Abdomen: Soft, non-tender; no masses or HSM, +BS  Extremities: FROM without synovitis. No clubbing or cyanosis of the hands. Skin: no rash, induration, lesions, or ulcers  Psych: Calm and cooperative. Normal judgement and insight. Normal mood and affect. Neuro: Alert and interactive, face symmetric, speech fluent. LABS:  All labs reviewed. Of note:  CBC:   Lab Results   Component Value Date/Time    WBC 8.6 08/11/2022 12:42 AM    RBC 4.30 08/11/2022 12:42 AM    HGB 12.6 08/11/2022 12:42 AM    HCT 38.7 08/11/2022 12:42 AM    MCV 90.0 08/11/2022 12:42 AM    MCH 29.3 08/11/2022 12:42 AM    MCHC 32.6 08/11/2022 12:42 AM    RDW 14.0 08/11/2022 12:42 AM     08/11/2022 12:42 AM    MPV 10.0 08/11/2022 12:42 AM     CMP:    Lab Results   Component Value Date/Time     08/12/2022 05:46 AM    K 4.5 08/12/2022 05:46 AM    K 4.0 08/10/2022 09:58 PM     08/12/2022 05:46 AM    CO2 23 08/12/2022 05:46 AM    BUN 10 08/12/2022 05:46 AM    CREATININE 0.9 08/12/2022 05:46 AM    GFRAA >60 08/12/2022 05:46 AM    LABGLOM >60 08/12/2022 05:46 AM    GLUCOSE 101 08/12/2022 05:46 AM    PROT 6.4 08/12/2022 05:46 AM    LABALBU 3.5 08/12/2022 05:46 AM    CALCIUM 8.7 08/12/2022 05:46 AM    BILITOT 0.5 08/12/2022 05:46 AM    ALKPHOS 78 08/12/2022 05:46 AM    AST 16 08/12/2022 05:46 AM    ALT 9 08/12/2022 05:46 AM       Imaging:  No imaging on this admission    EKG:  Sinus bradycardia  Nonspecific T wave abnormality    Telemetry:  I've personally reviewed the patient's telemetry:      ASSESSMENT/PLAN:  Active Problems:    * No active hospital problems. *  Resolved Problems:    * No resolved hospital problems.  *    Patient is a 42-year-old male admitted to [facility] for  Chest pain with coronary artery disease of LAD  -Transferred from [facility] for [address] [facility]  -[address] [facility] 8/11 > PCI with MYNOR to the mid LAD  -DAPT, BB and Stain   -Smoking cessation education  -Cardiology following  -A1c 6.1 with slightly elevated    -Cardiac rehab on discharge    Hypertension  -Monitor vitals  -Initiation of BB today, will recheck vitals following the administration     Medication for other comorbidities continue as appropriate dose adjustment as necessary.     Factor modifications discussed with patient including recent cardiovascular activity, and weight loss with dietary discretion    DVT prophylaxis  PCD's  PT OT  Discharge planning-at discretion of cardiology    Code status: Full  Requires inpatient level of care    Maria Whitley MD  10:31 AM  8/12/2022

## 2022-08-12 NOTE — CONSULTS
Comprehensive Nutrition Assessment    Type and Reason for Visit:  Initial, Consult    Nutrition Recommendations/Plan:     Start standard high calorie/high protein ONS (Ensure Enlive) BID  Continue current diet & monitor       Malnutrition Assessment:  Malnutrition Status:  Insufficient data (08/12/22 1152)    Context:  Acute Illness     Findings of the 6 clinical characteristics of malnutrition:  Energy Intake:  Mild decrease in energy intake (Comment) (Irregular meal patterns, stressors contributing.)  Weight Loss:  Unable to assess (Subjective wt loss, unknown amt/time frame.)     Body Fat Loss:  Unable to assess     Muscle Mass Loss:  Unable to assess    Fluid Accumulation:  Unable to assess     Strength:  Not Performed    Nutrition Assessment:    Pt admit s/p NSTEMI s/p PCI. PMHx HTN, SCI/TBI (2005). Tolerating oral diet. Will add ONS & monitor. Nutrition Related Findings:    A/O x 4. - 2.6 L I/O. No edema. GI WNL. Wound Type: None       Current Nutrition Intake & Therapies:    Average Meal Intake: %  Average Supplements Intake: None Ordered  ADULT DIET; Regular; Low Fat/Low Chol/High Fiber/2 gm Na    Anthropometric Measures:  Height: 5' 7\" (170.2 cm)  Ideal Body Weight (IBW): 148 lbs (67 kg)    Admission Body Weight: 118 lb 2.7 oz (53.6 kg) (8/11 bedscale. Question accuracy)  Current Body Weight: 158 lb 8.2 oz (71.9 kg) (8/12, RD bed scale), 107.1 % IBW. Weight Source: Bed Scale  Current BMI (kg/m2): 24.8  Usual Body Weight:  (KEVIN; lack EMR wt trend;  per pt UBW ~165# ~6 mos ago. Notes pants have become loose.)     Weight Adjustment For: No Adjustment                 BMI Categories: Normal Weight (BMI 18.5-24. 9)    Estimated Daily Nutrient Needs:  Energy Requirements Based On: Formula  Weight Used for Energy Requirements: Current  Energy (kcal/day):  kcal/d  Weight Used for Protein Requirements: Ideal  Protein (g/day): 80-90 gm pro/d (1.2-1.3 gm pro/kg IBW)  Method Used for Fluid Requirements: 1 ml/kcal  Fluid (ml/day):  ml/d    Nutrition Diagnosis:   Inadequate oral intake related to cardiac dysfunction (s/p NSTEMI) as evidenced by poor intake prior to admission    Nutrition Interventions:   Food and/or Nutrient Delivery: Continue Current Diet, Start Oral Nutrition Supplement (Start Ensure Enlive BID.)  Nutrition Education/Counseling: No recommendation at this time  Coordination of Nutrition Care: Continue to monitor while inpatient       Goals:     Goals: PO intake 75% or greater, by next RD assessment       Nutrition Monitoring and Evaluation:   Behavioral-Environmental Outcomes: None Identified  Food/Nutrient Intake Outcomes: Diet Advancement/Tolerance, Supplement Intake  Physical Signs/Symptoms Outcomes: Biochemical Data, GI Status, Nausea or Vomiting, Fluid Status or Edema, Hemodynamic Status, Nutrition Focused Physical Findings, Skin, Weight    Discharge Planning:     Too soon to determine     [employer], RD  Contact: [phone]

## 2022-08-18 NOTE — PROGRESS NOTES
Physician Progress Note      Marily Dominguez  [facility] #:                  [id number]  :                       1955  ADMIT DATE:       8/10/2022 11:15 PM  Chris Euceda DATE:        2022 12:23 PM  RESPONDING  PROVIDER #:        Leilani Celaya DO          QUERY TEXT:    Dear Attending Physician,    Pt admitted with chest pain. Pt noted to have \"Acute coronary syndrome with   chest pain \" per PCP notes and \" Unstable angina/NSTEMI. Opal Rast Opal Rast Treat as ACS \" per   Cardio note . If possible, please document in progress notes and discharge   summary if you are evaluating and/or treating any of the following: The medical record reflects the following:  Risk Factors: current smoker and strong family history significant for   premature CAD, Untreated hypertension  Clinical Indicators: Per H/P,\". Opal Rast Opal Rast Acute coronary syndrome with chest pain. Opal Rast Opal Rast \"      Per Cardio ,\". .. 1. Atypical chest pain with elevated hs-cTnT   (33>>47>>53): Currently chest pain-free. On IV Heparin gtt. 2. Sinus   bradycardia. Opal Rast Opal Rast Telemetry: Sinus bradycardiaEKG: Sinus without acute EKG changes   Impression: 1. Unstable angina/NSTEMI. Minimal elevation high-sensitivity   troponin with rise and fall pattern. Opal Rast Opal Rast Treat as ACS. Opal Rast Opal Rast \"  Treatment: Nitropaste, ASA, IV Heparin, statin, tx to [facility] for heart cath    Thank you,  Lucinda Nelson RN CCDS  Clinical Documentation Improvement Specialist  Options provided:  -- Chest pain due to CAD with unstable angina. NSTEMI was ruled out after   further study  -- Chest pain due to NSTEMI  -- Other - I will add my own diagnosis  -- Disagree - Not applicable / Not valid  -- Disagree - Clinically unable to determine / Unknown  -- Refer to Clinical Documentation Reviewer    PROVIDER RESPONSE TEXT:    This patient has an NSTEMI.     Query created by: Lynette Nash on 2022 10:06 AM      Electronically signed by:  Leilani Celaya DO 2022 6:03 AM

## 2022-09-14 ENCOUNTER — TELEPHONE (OUTPATIENT)
Dept: ADMINISTRATIVE | Age: 67
End: 2022-09-14

## 2022-09-28 ENCOUNTER — OFFICE VISIT (OUTPATIENT)
Dept: CARDIOLOGY CLINIC | Age: 67
End: 2022-09-28
Payer: MEDICARE

## 2022-09-28 VITALS
SYSTOLIC BLOOD PRESSURE: 138 MMHG | WEIGHT: 160.8 LBS | HEART RATE: 57 BPM | DIASTOLIC BLOOD PRESSURE: 90 MMHG | HEIGHT: 67 IN | BODY MASS INDEX: 25.24 KG/M2 | RESPIRATION RATE: 16 BRPM

## 2022-09-28 DIAGNOSIS — I25.10 CORONARY ARTERY DISEASE INVOLVING NATIVE CORONARY ARTERY OF NATIVE HEART WITHOUT ANGINA PECTORIS: ICD-10-CM

## 2022-09-28 DIAGNOSIS — I21.4 NSTEMI (NON-ST ELEVATED MYOCARDIAL INFARCTION) (HCC): Primary | ICD-10-CM

## 2022-09-28 PROCEDURE — 99213 OFFICE O/P EST LOW 20 MIN: CPT | Performed by: CLINICAL NURSE SPECIALIST

## 2022-09-28 PROCEDURE — 93000 ELECTROCARDIOGRAM COMPLETE: CPT | Performed by: INTERNAL MEDICINE

## 2022-09-28 PROCEDURE — 1123F ACP DISCUSS/DSCN MKR DOCD: CPT | Performed by: CLINICAL NURSE SPECIALIST

## 2022-09-28 RX ORDER — ASPIRIN 81 MG/1
81 TABLET, CHEWABLE ORAL DAILY
Qty: 90 TABLET | Refills: 5 | Status: SHIPPED | OUTPATIENT
Start: 2022-09-28

## 2022-09-28 RX ORDER — CARVEDILOL 6.25 MG/1
6.25 TABLET ORAL 2 TIMES DAILY WITH MEALS
Qty: 180 TABLET | Refills: 5 | Status: SHIPPED | OUTPATIENT
Start: 2022-09-28

## 2022-09-28 RX ORDER — ATORVASTATIN CALCIUM 80 MG/1
80 TABLET, FILM COATED ORAL DAILY
Qty: 90 TABLET | Refills: 5 | Status: SHIPPED | OUTPATIENT
Start: 2022-09-28

## 2022-09-28 RX ORDER — NITROGLYCERIN 0.4 MG/1
0.4 TABLET SUBLINGUAL EVERY 5 MIN PRN
Qty: 25 TABLET | Refills: 3 | Status: SHIPPED | OUTPATIENT
Start: 2022-09-28
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
## 2022-12-05 ENCOUNTER — OFFICE VISIT (OUTPATIENT)
Dept: CARDIOLOGY CLINIC | Age: 67
End: 2022-12-05
Payer: MEDICARE

## 2022-12-05 VITALS
HEIGHT: 67 IN | OXYGEN SATURATION: 98 % | RESPIRATION RATE: 16 BRPM | BODY MASS INDEX: 26.18 KG/M2 | HEART RATE: 53 BPM | WEIGHT: 166.8 LBS | DIASTOLIC BLOOD PRESSURE: 92 MMHG | SYSTOLIC BLOOD PRESSURE: 162 MMHG

## 2022-12-05 DIAGNOSIS — I51.9 HEART PROBLEM: Primary | ICD-10-CM

## 2022-12-05 DIAGNOSIS — I10 PRIMARY HYPERTENSION: ICD-10-CM

## 2022-12-05 DIAGNOSIS — I25.10 CORONARY ARTERY DISEASE INVOLVING NATIVE CORONARY ARTERY OF NATIVE HEART WITHOUT ANGINA PECTORIS: ICD-10-CM

## 2022-12-05 DIAGNOSIS — R00.1 SINUS BRADYCARDIA: ICD-10-CM

## 2022-12-05 DIAGNOSIS — R42 DIZZINESS: ICD-10-CM

## 2022-12-05 PROCEDURE — 99214 OFFICE O/P EST MOD 30 MIN: CPT | Performed by: INTERNAL MEDICINE

## 2022-12-05 PROCEDURE — 3074F SYST BP LT 130 MM HG: CPT | Performed by: INTERNAL MEDICINE

## 2022-12-05 PROCEDURE — 1123F ACP DISCUSS/DSCN MKR DOCD: CPT | Performed by: INTERNAL MEDICINE

## 2022-12-05 PROCEDURE — 3078F DIAST BP <80 MM HG: CPT | Performed by: INTERNAL MEDICINE

## 2022-12-05 PROCEDURE — 93000 ELECTROCARDIOGRAM COMPLETE: CPT | Performed by: INTERNAL MEDICINE

## 2022-12-05 RX ORDER — CARVEDILOL 3.12 MG/1
3.12 TABLET ORAL 2 TIMES DAILY WITH MEALS
Qty: 60 TABLET | Refills: 3 | Status: SHIPPED | OUTPATIENT
Start: 2022-12-05

## 2022-12-05 RX ORDER — LISINOPRIL 20 MG/1
20 TABLET ORAL DAILY
Qty: 30 TABLET | Refills: 3 | Status: SHIPPED | OUTPATIENT
Start: 2022-12-05

## 2022-12-05 NOTE — PROGRESS NOTES
OUTPATIENT CARDIOLOGY FOLLOW-UP    Name: Ria Rivera    Age: 75 y.o. Primary Care Physician: Cheyanne Godfrey DO    Date of Service: 12/5/2022    Chief Complaint:   Chief Complaint   Patient presents with    Dizziness     Once in awhile        Interim History:   Here for hospital follow-up. Seen for NSTEMI 8/2022. Went for cath, had severe LAD stenosis treated with a drug-eluting stent. He is back to working full-time, fairly physical work but no longer climbing up ladders as he used to be. Denies chest pain or shortness of breath but gets occasional exertional dizziness. Mostly going up stairs. Denies syncope. He quit smoking a day of his heart attack. Presents today with his son. He reports his father eats a lot of pretzels. Blood pressure running high. Review of Systems:   Negative except as described above    Past Medical History:  Past Medical History:   Diagnosis Date    Coronary artery disease     Family history of premature CAD     Hypertension     SCI (spinal cord injury)     Tobacco abuse        Past Surgical History:  No past surgical history on file. Family History:  No family history on file. Social History:  Social History     Tobacco Use    Smoking status: Every Day     Packs/day: 0.50     Types: Cigarettes    Smokeless tobacco: Never   Vaping Use    Vaping Use: Never used   Substance Use Topics    Alcohol use: No    Drug use: Never     Comment: uses delta-8 and delta 9 gummies to help sleep at night        Allergies:   Allergies   Allergen Reactions    Erythromycin Anaphylaxis       Current Medications:    Current Outpatient Medications:     carvedilol (COREG) 3.125 MG tablet, Take 1 tablet by mouth 2 times daily (with meals), Disp: 60 tablet, Rfl: 3    lisinopril (PRINIVIL;ZESTRIL) 20 MG tablet, Take 1 tablet by mouth daily, Disp: 30 tablet, Rfl: 3    ticagrelor (BRILINTA) 90 MG TABS tablet, Take 1 tablet by mouth 2 times daily, Disp: 180 tablet, Rfl: 5    atorvastatin (LIPITOR) 80 MG tablet, Take 1 tablet by mouth daily, Disp: 90 tablet, Rfl: 5    aspirin 81 MG chewable tablet, Take 1 tablet by mouth daily, Disp: 90 tablet, Rfl: 5    escitalopram (LEXAPRO) 20 MG tablet, Take 20 mg by mouth in the morning., Disp: , Rfl:     nitroGLYCERIN (NITROSTAT) 0.4 MG SL tablet, Place 1 tablet under the tongue every 5 minutes as needed for Chest pain (Patient not taking: Reported on 12/5/2022), Disp: 25 tablet, Rfl: 3    Physical Exam:  BP (!) 162/92   Pulse 53   Resp 16   Ht 5' 7\" (1.702 m)   Wt 166 lb 12.8 oz (75.7 kg)   SpO2 98%   BMI 26.12 kg/m²   Wt Readings from Last 3 Encounters:   12/05/22 166 lb 12.8 oz (75.7 kg)   09/28/22 160 lb 12.8 oz (72.9 kg)   08/12/22 158 lb 9.6 oz (71.9 kg)     Appearance: Awake, alert and oriented x 3, no acute respiratory distress  Skin: Intact, no rash  Head: Normocephalic, atraumatic  Eyes: EOMI, no conjunctival erythema  ENMT: No pharyngeal erythema, MMM, no rhinorrhea  Neck: Supple, no elevated JVP, no carotid bruits  Lungs: Clear to auscultation bilaterally. No wheezes, rales, or rhonchi.   Cardiac: Regular rhythm with a bradycardic rate +S1S2, no murmurs apparent  Abdomen: Soft, nontender, +bowel sounds  Extremities: Moves all extremities x 4, no lower extremity edema  Neurologic: No focal motor deficits apparent, normal mood and affect, alert and oriented x 3  Peripheral Pulses: Intact posterior tibial pulses bilaterally    Laboratory Tests:  Lab Results   Component Value Date    CREATININE 0.9 08/12/2022    BUN 10 08/12/2022     08/12/2022    K 4.5 08/12/2022     08/12/2022    CO2 23 08/12/2022     Lab Results   Component Value Date/Time    MG 2.0 08/12/2022 05:46 AM     Lab Results   Component Value Date    WBC 8.6 08/11/2022    HGB 12.6 08/11/2022    HCT 38.7 08/11/2022    MCV 90.0 08/11/2022     08/11/2022     Lab Results   Component Value Date    ALT 9 08/12/2022    AST 16 08/12/2022    ALKPHOS 78 08/12/2022    BILITOT 0.5 2022     No results found for: CKTOTAL, CKMB, CKMBINDEX, TROPONINI  No results found for: INR, PROTIME  Lab Results   Component Value Date    TSH 2.040 2022     Lab Results   Component Value Date    LABA1C 6.1 (H) 2022     No results found for: EAG  Lab Results   Component Value Date    CHOL 164 2022     Lab Results   Component Value Date    TRIG 81 2022     Lab Results   Component Value Date    HDL 43 2022     Lab Results   Component Value Date    LDLCALC 105 (H) 2022     Lab Results   Component Value Date    LABVLDL 16 2022     No results found for: CHOLHDLRATIO  No results for input(s): PROBNP in the last 72 hours. Cardiac Tests:  EC2022: Sinus bradycardia 53 beats minute. Normal axis and intervals. No ST-T wave changes. Echocardiogram:     Stress test:      Cardiac catheterization:   [address] [place] [address] [address]  CORONARY ANATOMY:  LEFT MAIN:  It is a long and large artery with no angiographic stenosis  noted. LAD:  It is a large artery giving rise to a bifurcating diagonal branch  that it has a dual anatomy distally. The LAD was moderately calcified  in its proximal to mid segment. There was 80% discrete mid stenosis  right before the takeoff of diagonal branch. There was also 50%  discrete mid stenosis in the medial branch of LAD. There was MALU 2 to  3 flow distally. LEFT CIRCUMFLEX:  It is a large artery giving rise to a very small first  obtuse marginal branch, a second small obtuse marginal branch which has  40% discrete proximal stenosis and then a third obtuse marginal branch. RIGHT CORONARY ARTERY:  It is a large, dominant artery giving rise to  two very small RV marginal branches, a large PDA, and a large PLV  branch. The RCA was moderately calcified in its proximal to mid  segment. There was 50% discrete eccentric proximal stenosis. There was  50% to 60% discrete eccentric mid stenosis.   There was 60% to 70%  discrete proximal PLV branch stenosis. LEFT VENTRICULOGRAM:  Revealed an ejection fraction of 55% to 60%. No  mitral regurgitation was noted. 3.0 x 26 Resolute Florencio stent was deployed at nominal pressure at the mid LAD with 0% residual stenosis and MALU-3 flow distally    Orders Placed This Encounter   Procedures    EKG 12 Lead    ECHO COMPLETE        Requested Prescriptions     Signed Prescriptions Disp Refills    carvedilol (COREG) 3.125 MG tablet 60 tablet 3     Sig: Take 1 tablet by mouth 2 times daily (with meals)    lisinopril (PRINIVIL;ZESTRIL) 20 MG tablet 30 tablet 3     Sig: Take 1 tablet by mouth daily        ASSESSMENT / PLAN:  Coronary artery disease  UA/NSTEMI 8/2022, PCI to mid LAD with moderate RCA disease  Dizziness  Hypertension, running high  Sinus bradycardia  History of heavy tobacco abuse, quit cigarettes 8/2022  Family history CAD    Recommendations:  Dizziness possibly due to bradycardia. Continue DAPT with aspirin and ticagrelor for minimum 1 year  Continue high intensity statin  Continue carvedilol but reduce dose to 3.125 mg twice daily  Add lisinopril 20 mg daily  Increase physical activity as able  Notify me if dizziness does not improve with above changes, will consider outpatient monitor and/or discontinuation of beta-blocker altogether  Check echocardiogram  Recommend low-sodium diet  Aggressive risk factor modification  Follow-up in 6 months or sooner if need arises    Discussed with patient and son    The patient's current medication list, allergies, problem list and results of all previously ordered testing were reviewed at today's visit.     Jannette Hu MD, [facility]

## 2023-02-03 ENCOUNTER — TELEPHONE (OUTPATIENT)
Dept: CARDIOLOGY | Age: 68
End: 2023-02-03

## 2023-02-07 ENCOUNTER — HOSPITAL ENCOUNTER (OUTPATIENT)
Dept: CARDIOLOGY | Age: 68
Discharge: HOME | End: 2023-02-07
Payer: MEDICARE

## 2023-02-07 PROCEDURE — 93306 TTE W/DOPPLER COMPLETE: CPT

## 2023-02-28 ENCOUNTER — TELEPHONE (OUTPATIENT)
Dept: CARDIOLOGY CLINIC | Age: 68
End: 2023-02-28

## 2023-02-28 NOTE — TELEPHONE ENCOUNTER
----- Message from Amanda Lala MD sent at 2/27/2023  4:39 PM EST -----  Echo shows normal pumping function and normal valves. Did his dizziness improve with reducing carvedilol dose? F/u as scheduled.

## 2023-04-05 RX ORDER — LISINOPRIL 20 MG/1
TABLET ORAL
Qty: 30 TABLET | Refills: 5 | Status: SHIPPED | OUTPATIENT
Start: 2023-04-05

## 2023-09-25 DIAGNOSIS — I25.10 CORONARY ARTERY DISEASE INVOLVING NATIVE CORONARY ARTERY OF NATIVE HEART WITHOUT ANGINA PECTORIS: ICD-10-CM

## 2023-09-25 RX ORDER — CARVEDILOL 3.12 MG/1
TABLET ORAL
Qty: 60 TABLET | Refills: 3 | Status: SHIPPED | OUTPATIENT
Start: 2023-09-25

## 2023-10-05 DIAGNOSIS — I25.10 CORONARY ARTERY DISEASE INVOLVING NATIVE CORONARY ARTERY OF NATIVE HEART WITHOUT ANGINA PECTORIS: ICD-10-CM

## 2023-10-05 RX ORDER — LISINOPRIL 20 MG/1
TABLET ORAL
Qty: 30 TABLET | Refills: 5 | Status: SHIPPED | OUTPATIENT
Start: 2023-10-05

## 2023-10-05 RX ORDER — CARVEDILOL 3.12 MG/1
3.12 TABLET ORAL 2 TIMES DAILY WITH MEALS
Qty: 60 TABLET | Refills: 3 | Status: SHIPPED | OUTPATIENT
Start: 2023-10-05

## 2023-10-23 DIAGNOSIS — I25.10 CORONARY ARTERY DISEASE INVOLVING NATIVE CORONARY ARTERY OF NATIVE HEART WITHOUT ANGINA PECTORIS: ICD-10-CM

## 2023-10-24 RX ORDER — TICAGRELOR 90 MG/1
90 TABLET ORAL 2 TIMES DAILY
Qty: 180 TABLET | Refills: 3 | Status: SHIPPED
Start: 2023-10-24 | End: 2023-10-25 | Stop reason: SDUPTHER

## 2023-10-25 DIAGNOSIS — I25.10 CORONARY ARTERY DISEASE INVOLVING NATIVE CORONARY ARTERY OF NATIVE HEART WITHOUT ANGINA PECTORIS: ICD-10-CM

## 2023-11-01 ENCOUNTER — APPOINTMENT (OUTPATIENT)
Dept: GENERAL RADIOLOGY | Age: 68
End: 2023-11-01
Payer: MEDICARE

## 2023-11-01 ENCOUNTER — HOSPITAL ENCOUNTER (EMERGENCY)
Age: 68
Discharge: HOME | End: 2023-11-01
Attending: EMERGENCY MEDICINE
Payer: MEDICARE

## 2023-11-01 ENCOUNTER — TELEPHONE (OUTPATIENT)
Dept: CARDIOLOGY CLINIC | Age: 68
End: 2023-11-01

## 2023-11-01 VITALS
HEART RATE: 99 BPM | SYSTOLIC BLOOD PRESSURE: 121 MMHG | TEMPERATURE: 98.9 F | BODY MASS INDEX: 27.25 KG/M2 | DIASTOLIC BLOOD PRESSURE: 64 MMHG | WEIGHT: 174 LBS | OXYGEN SATURATION: 95 % | RESPIRATION RATE: 16 BRPM

## 2023-11-01 DIAGNOSIS — R55 SYNCOPE AND COLLAPSE: Primary | ICD-10-CM

## 2023-11-01 DIAGNOSIS — U07.1 COVID: ICD-10-CM

## 2023-11-01 DIAGNOSIS — N30.00 ACUTE CYSTITIS WITHOUT HEMATURIA: ICD-10-CM

## 2023-11-01 LAB
ALBUMIN SERPL-MCNC: 3.2 G/DL (ref 3.5–5.2)
ALP SERPL-CCNC: 66 U/L (ref 40–129)
ALT SERPL-CCNC: 12 U/L (ref 0–40)
AST SERPL-CCNC: 21 U/L (ref 0–39)
ATYPICAL LYMPHOCYTE ABSOLUTE COUNT: 0.18 K/UL (ref 0–0.46)
ATYPICAL LYMPHOCYTES: 1 % (ref 0–4)
BACTERIA URNS QL MICRO: ABNORMAL
BASOPHILS # BLD: 0 K/UL (ref 0–0.2)
BASOPHILS NFR BLD: 0 % (ref 0–2)
BILIRUB DIRECT SERPL-MCNC: <0.2 MG/DL (ref 0–0.3)
BILIRUB INDIRECT SERPL-MCNC: ABNORMAL MG/DL (ref 0–1)
BILIRUB SERPL-MCNC: 1.1 MG/DL (ref 0–1.2)
BILIRUB UR QL STRIP: NEGATIVE
CLARITY UR: CLEAR
COLOR UR: YELLOW
EOSINOPHIL # BLD: 0 K/UL (ref 0.05–0.5)
EOSINOPHILS RELATIVE PERCENT: 0 % (ref 0–6)
ERYTHROCYTE [DISTWIDTH] IN BLOOD BY AUTOMATED COUNT: 14.3 % (ref 11.5–15)
FLUAV RNA RESP QL NAA+PROBE: NOT DETECTED
FLUBV RNA RESP QL NAA+PROBE: NOT DETECTED
GLUCOSE UR STRIP-MCNC: NEGATIVE MG/DL
HCT VFR BLD AUTO: 37 % (ref 37–54)
HGB BLD-MCNC: 12.2 G/DL (ref 12.5–16.5)
HGB UR QL STRIP.AUTO: NEGATIVE
KETONES UR STRIP-MCNC: ABNORMAL MG/DL
LEUKOCYTE ESTERASE UR QL STRIP: ABNORMAL
LYMPHOCYTES NFR BLD: 1.65 K/UL (ref 1.5–4)
LYMPHOCYTES RELATIVE PERCENT: 9 % (ref 20–42)
MCH RBC QN AUTO: 29.2 PG (ref 26–35)
MCHC RBC AUTO-ENTMCNC: 33 G/DL (ref 32–34.5)
MCV RBC AUTO: 88.5 FL (ref 80–99.9)
MONOCYTES NFR BLD: 1.1 K/UL (ref 0.1–0.95)
MONOCYTES NFR BLD: 6 % (ref 2–12)
NEUTROPHILS NFR BLD: 84 % (ref 43–80)
NEUTS SEG NFR BLD: 15.37 K/UL (ref 1.8–7.3)
NITRITE UR QL STRIP: POSITIVE
PH UR STRIP: 5.5 [PH] (ref 5–9)
PLATELET # BLD AUTO: 287 K/UL (ref 130–450)
PMV BLD AUTO: 11.1 FL (ref 7–12)
PROT SERPL-MCNC: 6.3 G/DL (ref 6.4–8.3)
PROT UR STRIP-MCNC: NEGATIVE MG/DL
RBC # BLD AUTO: 4.18 M/UL (ref 3.8–5.8)
RBC # BLD: ABNORMAL 10*6/UL
RBC #/AREA URNS HPF: ABNORMAL /HPF
SARS-COV-2 RNA RESP QL NAA+PROBE: DETECTED
SOURCE: ABNORMAL
SP GR UR STRIP: 1.02 (ref 1–1.03)
SPECIMEN DESCRIPTION: ABNORMAL
TROPONIN I SERPL HS-MCNC: 15 NG/L (ref 0–11)
UROBILINOGEN UR STRIP-ACNC: 0.2 EU/DL (ref 0–1)
WBC #/AREA URNS HPF: ABNORMAL /HPF
WBC OTHER # BLD: 18.3 K/UL (ref 4.5–11.5)

## 2023-11-01 PROCEDURE — 2580000003 HC RX 258

## 2023-11-01 PROCEDURE — 84484 ASSAY OF TROPONIN QUANT: CPT

## 2023-11-01 PROCEDURE — 81001 URINALYSIS AUTO W/SCOPE: CPT

## 2023-11-01 PROCEDURE — 80076 HEPATIC FUNCTION PANEL: CPT

## 2023-11-01 PROCEDURE — 93005 ELECTROCARDIOGRAM TRACING: CPT | Performed by: EMERGENCY MEDICINE

## 2023-11-01 PROCEDURE — 85025 COMPLETE CBC W/AUTO DIFF WBC: CPT

## 2023-11-01 PROCEDURE — 96361 HYDRATE IV INFUSION ADD-ON: CPT

## 2023-11-01 PROCEDURE — 96360 HYDRATION IV INFUSION INIT: CPT

## 2023-11-01 PROCEDURE — 70450 CT HEAD/BRAIN W/O DYE: CPT

## 2023-11-01 PROCEDURE — 99285 EMERGENCY DEPT VISIT HI MDM: CPT

## 2023-11-01 PROCEDURE — 71046 X-RAY EXAM CHEST 2 VIEWS: CPT

## 2023-11-01 PROCEDURE — 6370000000 HC RX 637 (ALT 250 FOR IP): Performed by: EMERGENCY MEDICINE

## 2023-11-01 PROCEDURE — 87636 SARSCOV2 & INF A&B AMP PRB: CPT

## 2023-11-01 RX ORDER — CEPHALEXIN 500 MG/1
500 CAPSULE ORAL ONCE
Status: COMPLETED | OUTPATIENT
Start: 2023-11-01 | End: 2023-11-01

## 2023-11-01 RX ORDER — 0.9 % SODIUM CHLORIDE 0.9 %
1000 INTRAVENOUS SOLUTION INTRAVENOUS ONCE
Status: COMPLETED | OUTPATIENT
Start: 2023-11-01 | End: 2023-11-01

## 2023-11-01 RX ORDER — ONDANSETRON 2 MG/ML
4 INJECTION INTRAMUSCULAR; INTRAVENOUS ONCE
Status: DISCONTINUED | OUTPATIENT
Start: 2023-11-01 | End: 2023-11-01 | Stop reason: HOSPADM

## 2023-11-01 RX ORDER — CEPHALEXIN 500 MG/1
500 CAPSULE ORAL 4 TIMES DAILY
Qty: 20 CAPSULE | Refills: 0 | Status: SHIPPED | OUTPATIENT
Start: 2023-11-01 | End: 2023-11-06

## 2023-11-01 RX ADMIN — CEPHALEXIN 500 MG: 500 CAPSULE ORAL at 17:47

## 2023-11-01 RX ADMIN — SODIUM CHLORIDE 1000 ML: 9 INJECTION, SOLUTION INTRAVENOUS at 15:34

## 2023-11-01 ASSESSMENT — PAIN - FUNCTIONAL ASSESSMENT: PAIN_FUNCTIONAL_ASSESSMENT: NONE - DENIES PAIN

## 2023-11-01 ASSESSMENT — PAIN SCALES - GENERAL: PAINLEVEL_OUTOF10: 0

## 2023-11-01 NOTE — ED NOTES
Labs attempted without success. Covid swab obtained. Patient is attempting urine specimen.      Rosemary Rodney RN  11/01/23 [id number]

## 2023-11-01 NOTE — ED PROVIDER NOTES
[address] [address], [facility] ENCOUNTER        Pt Name: Flynn Velasquez  MRN: [id number]  [address] [facility] 1955  Date of evaluation: 11/1/2023  Provider: Paula Marcum DO  PCP: Geronimo Lira DO  Note Started: 2:22 PM EDT 11/1/23    CHIEF COMPLAINT       Chief Complaint   Patient presents with    Dizziness     Yesterday started with headache, vomiting, dizziness,  today dizzy, vomiting ( did eat old eggs yesterday)       HISTORY OF PRESENT ILLNESS: 1 or more Elements   History From: patient    Limitations to history : None    Flynn Velasquez is a 70 y.o. male who presents to the emergency department for lightheadedness that started last night. Patient reports that he ate old eggs last night and then started to experience nausea with dry heaving. He had episodes of emesis this morning 1 episode of vomiting today. He tried to go to work, felt lightheaded and bumped his head. No loss of consciousness. He reports he feels like he is dehydrated on physical exam with the dry heaving. History of TBI and is dual antiplatelet. Patient denies fever, chills, shortness of breath, chest pain, abdominal pain, lightheadedness, dysuria, hematuria, hematochezia, and melena. Nursing Notes were all reviewed and agreed with or any disagreements were addressed in the HPI. REVIEW OF SYSTEMS :           Positives and Pertinent negatives as per HPI. SURGICAL HISTORY   No past surgical history on file.      [facility]       Discharge Medication List as of 11/1/2023  5:45 PM        CONTINUE these medications which have NOT CHANGED    Details   ticagrelor (BRILINTA) 90 MG TABS tablet Take 1 tablet by mouth 2 times daily, Disp-180 tablet, R-3Normal      lisinopril (PRINIVIL;ZESTRIL) 20 MG tablet take 1 tablet by mouth once daily, Disp-30 tablet, R-5Normal      carvedilol (COREG) 3.125 MG tablet Take 1 tablet by mouth with breakfast and with evening meal, Disp-60

## 2023-11-01 NOTE — ED NOTES
Provider notified of attempted sticks and unsuccessful     Tia Paul, [address]  11/01/23 [phone]

## 2023-11-02 LAB
EKG ATRIAL RATE: 92 BPM
EKG P AXIS: 57 DEGREES
EKG P-R INTERVAL: 142 MS
EKG Q-T INTERVAL: 360 MS
EKG QRS DURATION: 90 MS
EKG QTC CALCULATION (BAZETT): 445 MS
EKG R AXIS: 51 DEGREES
EKG T AXIS: 66 DEGREES
EKG VENTRICULAR RATE: 92 BPM

## 2023-11-02 PROCEDURE — 93010 ELECTROCARDIOGRAM REPORT: CPT | Performed by: INTERNAL MEDICINE

## 2023-11-03 NOTE — TELEPHONE ENCOUNTER
Okay to discontinue after he finishes his current supply, as he is greater than 1 year out from his stent.

## 2023-12-04 DIAGNOSIS — I25.10 CORONARY ARTERY DISEASE INVOLVING NATIVE CORONARY ARTERY OF NATIVE HEART WITHOUT ANGINA PECTORIS: ICD-10-CM

## 2023-12-04 RX ORDER — ASPIRIN 81 MG
81 TABLET,CHEWABLE ORAL DAILY
Qty: 90 TABLET | Refills: 3 | Status: SHIPPED | OUTPATIENT
Start: 2023-12-04

## 2023-12-04 RX ORDER — ATORVASTATIN CALCIUM 80 MG/1
80 TABLET, FILM COATED ORAL DAILY
Qty: 90 TABLET | Refills: 3 | Status: SHIPPED | OUTPATIENT
Start: 2023-12-04

## 2023-12-06 ENCOUNTER — OFFICE VISIT (OUTPATIENT)
Dept: CARDIOLOGY CLINIC | Age: 68
End: 2023-12-06
Payer: MEDICARE

## 2023-12-06 VITALS
SYSTOLIC BLOOD PRESSURE: 130 MMHG | RESPIRATION RATE: 16 BRPM | BODY MASS INDEX: 28.19 KG/M2 | HEIGHT: 67 IN | WEIGHT: 179.6 LBS | HEART RATE: 64 BPM | DIASTOLIC BLOOD PRESSURE: 84 MMHG

## 2023-12-06 DIAGNOSIS — I10 PRIMARY HYPERTENSION: ICD-10-CM

## 2023-12-06 DIAGNOSIS — R42 DIZZINESS: Primary | ICD-10-CM

## 2023-12-06 DIAGNOSIS — I25.10 CORONARY ARTERY DISEASE INVOLVING NATIVE CORONARY ARTERY OF NATIVE HEART WITHOUT ANGINA PECTORIS: ICD-10-CM

## 2023-12-06 PROCEDURE — 3079F DIAST BP 80-89 MM HG: CPT | Performed by: CLINICAL NURSE SPECIALIST

## 2023-12-06 PROCEDURE — 1123F ACP DISCUSS/DSCN MKR DOCD: CPT | Performed by: CLINICAL NURSE SPECIALIST

## 2023-12-06 PROCEDURE — 99213 OFFICE O/P EST LOW 20 MIN: CPT | Performed by: CLINICAL NURSE SPECIALIST

## 2023-12-06 PROCEDURE — 3075F SYST BP GE 130 - 139MM HG: CPT | Performed by: CLINICAL NURSE SPECIALIST

## 2023-12-06 PROCEDURE — 93000 ELECTROCARDIOGRAM COMPLETE: CPT | Performed by: INTERNAL MEDICINE

## 2023-12-06 RX ORDER — CARVEDILOL 3.12 MG/1
3.12 TABLET ORAL 2 TIMES DAILY WITH MEALS
Qty: 180 TABLET | Refills: 3 | Status: SHIPPED | OUTPATIENT
Start: 2023-12-06

## 2023-12-06 RX ORDER — LISINOPRIL 20 MG/1
20 TABLET ORAL DAILY
Qty: 90 TABLET | Refills: 5 | Status: SHIPPED | OUTPATIENT
Start: 2023-12-06

## 2023-12-06 NOTE — PROGRESS NOTES
regurgitation was noted. CORONARY INTERVENTION NOTE:  A 6-Estonian JR4 guide catheter was used to  engage the right coronary artery. Then, a Daly OmniWire was zeroed  and normalized and was advanced to the distal RCA with no difficulty. IFR obtained twice was 0.99. The wire was then advanced into the distal PLV branch and iFR obtained was 0.97 twice. The wire was pulled out. The guide catheter was exchanged over a guidewire to a 6-Estonian EBU 3.5 guide catheter which was used to engage the left main coronary artery. Then, the patient received an extra 2000 units of intravenous heparin. Then, a Runthrough wire was advanced into the diagonal branch without difficulty. A second Runthrough wire could not be advanced into the  distal LAD. Then, a BMW wire was advanced into the distal LAD with some  difficulty. Then, a 2.5 x 12 Euphora balloon was inflated twice at nominal pressure at the mid LAD. Then, a 3.0 x 26 Resolute Florencio stent was deployed at nominal pressure at the mid LAD with 0% residual stenosis and MALU-3 flow distally. At the end of the PCI, both wires were pulled out. The guide catheter was pulled out. The Terumo Slender  sheath was pulled out and a Vasc Band was applied over the right radial artery with good hemostasis. The patient tolerated the procedure very well and no complications were noted. No significant blood loss occurred during the procedure. The patient's ACT was 287. He received two tablets of crushed Brilinta. IMPRESSION:  1.  80% discrete mid LAD stenosis, status post successful PCI using  drug-eluting stent. 2.  Moderate proximal and mid RCA as well as proximal PLV branch,  non-physiologically significant. 3.  LVEDP 7 mmHg. 4.  Ejection fraction 55% to 60%. Echocardiogram 2/07/2023 (Dr. Nir Olsen):    Summary   Normal left ventricular size and systolic function. Ejection fraction is visually estimated at 65-70%. Normal diastolic function.    No regional wall motion

## 2024-01-17 DIAGNOSIS — I25.10 CORONARY ARTERY DISEASE INVOLVING NATIVE CORONARY ARTERY OF NATIVE HEART WITHOUT ANGINA PECTORIS: ICD-10-CM

## 2024-01-17 RX ORDER — LISINOPRIL 20 MG/1
20 TABLET ORAL DAILY
Qty: 90 TABLET | Refills: 3 | Status: SHIPPED | OUTPATIENT
Start: 2024-01-17

## 2024-03-21 ENCOUNTER — OFFICE VISIT (OUTPATIENT)
Dept: CARDIOLOGY CLINIC | Age: 69
End: 2024-03-21
Payer: MEDICARE

## 2024-03-21 VITALS
HEART RATE: 56 BPM | DIASTOLIC BLOOD PRESSURE: 70 MMHG | RESPIRATION RATE: 16 BRPM | WEIGHT: 183 LBS | SYSTOLIC BLOOD PRESSURE: 118 MMHG | HEIGHT: 67 IN | BODY MASS INDEX: 28.72 KG/M2

## 2024-03-21 DIAGNOSIS — I10 PRIMARY HYPERTENSION: ICD-10-CM

## 2024-03-21 DIAGNOSIS — I25.10 CORONARY ARTERY DISEASE INVOLVING NATIVE CORONARY ARTERY OF NATIVE HEART WITHOUT ANGINA PECTORIS: Primary | ICD-10-CM

## 2024-03-21 PROCEDURE — 99213 OFFICE O/P EST LOW 20 MIN: CPT | Performed by: INTERNAL MEDICINE

## 2024-03-21 PROCEDURE — 3074F SYST BP LT 130 MM HG: CPT | Performed by: INTERNAL MEDICINE

## 2024-03-21 PROCEDURE — 3078F DIAST BP <80 MM HG: CPT | Performed by: INTERNAL MEDICINE

## 2024-03-21 PROCEDURE — 1123F ACP DISCUSS/DSCN MKR DOCD: CPT | Performed by: INTERNAL MEDICINE

## 2024-03-21 PROCEDURE — 93000 ELECTROCARDIOGRAM COMPLETE: CPT | Performed by: INTERNAL MEDICINE

## 2024-03-21 NOTE — PROGRESS NOTES
11/01/2023    ALKPHOS 66 11/01/2023    BILITOT 1.1 11/01/2023     No results found for: \"CKTOTAL\", \"CKMB\", \"CKMBINDEX\", \"TROPONINI\"  No results found for: \"INR\", \"PROTIME\"  Lab Results   Component Value Date    TSH 2.040 08/11/2022     Lab Results   Component Value Date    LABA1C 6.1 (H) 08/11/2022     No results found for: \"EAG\"  Lab Results   Component Value Date    CHOL 164 08/11/2022     Lab Results   Component Value Date    TRIG 81 08/11/2022     Lab Results   Component Value Date    HDL 43 08/11/2022     Lab Results   Component Value Date    LDLCALC 105 (H) 08/11/2022     No results found for: \"VLDL\"    No results found for: \"CHOLHDLRATIO\"  No results for input(s): \"PROBNP\" in the last 72 hours.    Cardiac Tests:  ECG:   3/21/2024: Sinus bradycardia 56 bpm.  Normal axis and intervals.  No ST or T wave changes.    Echocardiogram:   TTE 2/2023   Summary   Normal left ventricular size and systolic function.   Ejection fraction is visually estimated at 65-70%.   Normal diastolic function.   No regional wall motion abnormalities seen.   Mild left ventricular concentric hypertrophy noted.   Normal right ventricular size and function.   No significant valvular abnormalities.   Ascending aorta borderline dilated 3.7 cm.    Stress test:      Cardiac catheterization:   [facility] 8/11/22 [place]  CORONARY ANATOMY:  LEFT MAIN:  It is a long and large artery with no angiographic stenosis  noted.     LAD:  It is a large artery giving rise to a bifurcating diagonal branch  that it has a dual anatomy distally.  The LAD was moderately calcified  in its proximal to mid segment.  There was 80% discrete mid stenosis  right before the takeoff of diagonal branch.  There was also 50%  discrete mid stenosis in the medial branch of LAD.  There was MALU 2 to  3 flow distally.     LEFT CIRCUMFLEX:  It is a large artery giving rise to a very small first  obtuse marginal branch, a second small obtuse marginal branch which has  40% discrete

## 2024-12-13 DIAGNOSIS — I25.10 CORONARY ARTERY DISEASE INVOLVING NATIVE CORONARY ARTERY OF NATIVE HEART WITHOUT ANGINA PECTORIS: ICD-10-CM

## 2024-12-16 RX ORDER — ATORVASTATIN CALCIUM 80 MG/1
80 TABLET, FILM COATED ORAL DAILY
Qty: 90 TABLET | Refills: 3 | Status: SHIPPED | OUTPATIENT
Start: 2024-12-16

## 2024-12-20 DIAGNOSIS — I25.10 CORONARY ARTERY DISEASE INVOLVING NATIVE CORONARY ARTERY OF NATIVE HEART WITHOUT ANGINA PECTORIS: ICD-10-CM

## 2024-12-20 RX ORDER — CARVEDILOL 3.12 MG/1
3.12 TABLET ORAL 2 TIMES DAILY WITH MEALS
Qty: 180 TABLET | Refills: 3 | Status: SHIPPED | OUTPATIENT
Start: 2024-12-20

## 2025-02-04 DIAGNOSIS — I25.10 CORONARY ARTERY DISEASE INVOLVING NATIVE CORONARY ARTERY OF NATIVE HEART WITHOUT ANGINA PECTORIS: ICD-10-CM

## 2025-02-05 RX ORDER — LISINOPRIL 20 MG/1
20 TABLET ORAL DAILY
Qty: 90 TABLET | Refills: 3 | Status: SHIPPED | OUTPATIENT
Start: 2025-02-05

## 2025-03-21 ENCOUNTER — TELEPHONE (OUTPATIENT)
Dept: CARDIOLOGY CLINIC | Age: 70
End: 2025-03-21

## 2025-03-21 ENCOUNTER — OFFICE VISIT (OUTPATIENT)
Dept: CARDIOLOGY CLINIC | Age: 70
End: 2025-03-21
Payer: MEDICARE

## 2025-03-21 VITALS
HEART RATE: 57 BPM | OXYGEN SATURATION: 96 % | RESPIRATION RATE: 18 BRPM | SYSTOLIC BLOOD PRESSURE: 126 MMHG | DIASTOLIC BLOOD PRESSURE: 64 MMHG | BODY MASS INDEX: 30.51 KG/M2 | TEMPERATURE: 97.5 F | WEIGHT: 194.4 LBS | HEIGHT: 67 IN

## 2025-03-21 DIAGNOSIS — E78.5 HYPERLIPIDEMIA, UNSPECIFIED HYPERLIPIDEMIA TYPE: ICD-10-CM

## 2025-03-21 DIAGNOSIS — I10 PRIMARY HYPERTENSION: ICD-10-CM

## 2025-03-21 DIAGNOSIS — R00.1 SINUS BRADYCARDIA: ICD-10-CM

## 2025-03-21 DIAGNOSIS — I25.10 CORONARY ARTERY DISEASE INVOLVING NATIVE CORONARY ARTERY OF NATIVE HEART WITHOUT ANGINA PECTORIS: Primary | ICD-10-CM

## 2025-03-21 PROCEDURE — 99214 OFFICE O/P EST MOD 30 MIN: CPT | Performed by: INTERNAL MEDICINE

## 2025-03-21 PROCEDURE — 3074F SYST BP LT 130 MM HG: CPT | Performed by: INTERNAL MEDICINE

## 2025-03-21 PROCEDURE — 93000 ELECTROCARDIOGRAM COMPLETE: CPT | Performed by: INTERNAL MEDICINE

## 2025-03-21 PROCEDURE — 1159F MED LIST DOCD IN RCRD: CPT | Performed by: INTERNAL MEDICINE

## 2025-03-21 PROCEDURE — 1123F ACP DISCUSS/DSCN MKR DOCD: CPT | Performed by: INTERNAL MEDICINE

## 2025-03-21 PROCEDURE — 3078F DIAST BP <80 MM HG: CPT | Performed by: INTERNAL MEDICINE

## 2025-03-21 RX ORDER — EZETIMIBE 10 MG/1
10 TABLET ORAL DAILY
COMMUNITY
Start: 2025-02-04

## 2025-03-21 NOTE — TELEPHONE ENCOUNTER
----- Message from Dr. Ben Kaufman MD sent at 3/21/2025  1:18 PM EDT -----  Recent labs from Dr. Jackman please primarily lipids